# Patient Record
Sex: MALE | Race: WHITE | NOT HISPANIC OR LATINO | Employment: FULL TIME | URBAN - METROPOLITAN AREA
[De-identification: names, ages, dates, MRNs, and addresses within clinical notes are randomized per-mention and may not be internally consistent; named-entity substitution may affect disease eponyms.]

---

## 2023-09-22 ENCOUNTER — HOSPITAL ENCOUNTER (OUTPATIENT)
Dept: DATA CONVERSION | Facility: HOSPITAL | Age: 28
Discharge: HOME | End: 2023-09-23
Payer: COMMERCIAL

## 2023-09-22 DIAGNOSIS — R06.02 SHORTNESS OF BREATH: ICD-10-CM

## 2023-09-22 DIAGNOSIS — Z87.891 PERSONAL HISTORY OF NICOTINE DEPENDENCE: ICD-10-CM

## 2023-09-22 DIAGNOSIS — R07.89 OTHER CHEST PAIN: ICD-10-CM

## 2023-09-22 LAB
ANION GAP SERPL CALCULATED.3IONS-SCNC: 12 MMOL/L (ref 0–19)
BASOPHILS # BLD AUTO: 0.05 K/UL (ref 0–0.22)
BASOPHILS NFR BLD AUTO: 0.5 % (ref 0–1)
BUN SERPL-MCNC: 13 MG/DL (ref 8–25)
BUN/CREAT SERPL: 14.4 RATIO (ref 8–21)
CALCIUM SERPL-MCNC: 9.6 MG/DL (ref 8.5–10.4)
CHLORIDE SERPL-SCNC: 99 MMOL/L (ref 97–107)
CO2 SERPL-SCNC: 21 MMOL/L (ref 24–31)
CREAT SERPL-MCNC: 0.9 MG/DL (ref 0.4–1.6)
D DIMER PPP FEU-MCNC: 0.19 MG/L FEU (ref 0.19–0.5)
DEPRECATED RDW RBC AUTO: 37.6 FL (ref 37–54)
DIFFERENTIAL METHOD BLD: NORMAL
EOSINOPHIL # BLD AUTO: 0.14 K/UL (ref 0–0.45)
EOSINOPHIL NFR BLD: 1.4 % (ref 0–3)
ERYTHROCYTE [DISTWIDTH] IN BLOOD BY AUTOMATED COUNT: 12.1 % (ref 11.7–15)
GFR SERPL CREATININE-BSD FRML MDRD: 120 ML/MIN/1.73 M2
GLUCOSE SERPL-MCNC: 101 MG/DL (ref 65–99)
HCT VFR BLD AUTO: 44.7 % (ref 41–50)
HGB BLD-MCNC: 15.7 GM/DL (ref 13.5–16.5)
HS TROPONIN T DELTA: 0 (ref 0–4)
HS TROPONIN T DELTA: NORMAL (ref 0–4)
IMM GRANULOCYTES # BLD AUTO: 0.03 K/UL (ref 0–0.1)
LYMPHOCYTES # BLD AUTO: 2.32 K/UL (ref 1.2–3.2)
LYMPHOCYTES NFR BLD MANUAL: 22.8 % (ref 20–40)
MCH RBC QN AUTO: 29.9 PG (ref 26–34)
MCHC RBC AUTO-ENTMCNC: 35.1 % (ref 31–37)
MCV RBC AUTO: 85.1 FL (ref 80–100)
MONOCYTES # BLD AUTO: 0.69 K/UL (ref 0–0.8)
MONOCYTES NFR BLD MANUAL: 6.8 % (ref 0–8)
NEUTROPHILS # BLD AUTO: 6.96 K/UL
NEUTROPHILS # BLD AUTO: 6.96 K/UL (ref 1.8–7.7)
NEUTROPHILS.IMMATURE NFR BLD: 0.3 % (ref 0–1)
NEUTS SEG NFR BLD: 68.2 % (ref 50–70)
NRBC BLD-RTO: 0 /100 WBC
PLATELET # BLD AUTO: 290 K/UL (ref 150–450)
PMV BLD AUTO: 8.6 CU (ref 7–12.6)
POTASSIUM SERPL-SCNC: 3.9 MMOL/L (ref 3.4–5.1)
RBC # BLD AUTO: 5.25 M/UL (ref 4.5–5.5)
SODIUM SERPL-SCNC: 132 MMOL/L (ref 133–145)
TROPONIN T SERPL-MCNC: 6 NG/L
TROPONIN T SERPL-MCNC: 6 NG/L
WBC # BLD AUTO: 10.2 K/UL (ref 4.5–11)

## 2023-09-23 LAB
ANION GAP SERPL CALCULATED.3IONS-SCNC: 11 MMOL/L (ref 0–19)
BASOPHILS # BLD AUTO: 0.05 K/UL (ref 0–0.22)
BASOPHILS NFR BLD AUTO: 0.6 % (ref 0–1)
BUN SERPL-MCNC: 18 MG/DL (ref 8–25)
BUN/CREAT SERPL: 13.8 RATIO (ref 8–21)
CALCIUM SERPL-MCNC: 9.3 MG/DL (ref 8.5–10.4)
CHLORIDE SERPL-SCNC: 102 MMOL/L (ref 97–107)
CO2 SERPL-SCNC: 22 MMOL/L (ref 24–31)
CREAT SERPL-MCNC: 1.3 MG/DL (ref 0.4–1.6)
CRP SERPL-MCNC: 1.5 MG/DL (ref 0–2)
DEPRECATED RDW RBC AUTO: 39 FL (ref 37–54)
DIFFERENTIAL METHOD BLD: NORMAL
EOSINOPHIL # BLD AUTO: 0.13 K/UL (ref 0–0.45)
EOSINOPHIL NFR BLD: 1.7 % (ref 0–3)
ERYTHROCYTE [DISTWIDTH] IN BLOOD BY AUTOMATED COUNT: 12.2 % (ref 11.7–15)
ERYTHROCYTE [SEDIMENTATION RATE] IN BLOOD BY WESTERGREN METHOD: 26 MM/HR (ref 0–15)
GFR SERPL CREATININE-BSD FRML MDRD: 77 ML/MIN/1.73 M2
GLUCOSE SERPL-MCNC: 91 MG/DL (ref 65–99)
HCT VFR BLD AUTO: 42.9 % (ref 41–50)
HGB BLD-MCNC: 14.6 GM/DL (ref 13.5–16.5)
HS TROPONIN T DELTA: 0 (ref 0–4)
HS TROPONIN T DELTA: 0 (ref 0–4)
IMM GRANULOCYTES # BLD AUTO: 0.04 K/UL (ref 0–0.1)
LYMPHOCYTES # BLD AUTO: 2.12 K/UL (ref 1.2–3.2)
LYMPHOCYTES NFR BLD MANUAL: 27.3 % (ref 20–40)
MCH RBC QN AUTO: 29.7 PG (ref 26–34)
MCHC RBC AUTO-ENTMCNC: 34 % (ref 31–37)
MCV RBC AUTO: 87.2 FL (ref 80–100)
MONOCYTES # BLD AUTO: 0.59 K/UL (ref 0–0.8)
MONOCYTES NFR BLD MANUAL: 7.6 % (ref 0–8)
NEUTROPHILS # BLD AUTO: 4.83 K/UL
NEUTROPHILS # BLD AUTO: 4.83 K/UL (ref 1.8–7.7)
NEUTROPHILS.IMMATURE NFR BLD: 0.5 % (ref 0–1)
NEUTS SEG NFR BLD: 62.3 % (ref 50–70)
NRBC BLD-RTO: 0 /100 WBC
PLATELET # BLD AUTO: 255 K/UL (ref 150–450)
PMV BLD AUTO: 8.9 CU (ref 7–12.6)
POTASSIUM SERPL-SCNC: 4 MMOL/L (ref 3.4–5.1)
RBC # BLD AUTO: 4.92 M/UL (ref 4.5–5.5)
SODIUM SERPL-SCNC: 135 MMOL/L (ref 133–145)
TROPONIN T SERPL-MCNC: 6 NG/L
TROPONIN T SERPL-MCNC: 6 NG/L
WBC # BLD AUTO: 7.8 K/UL (ref 4.5–11)

## 2023-09-24 LAB
ALT SERPL-CCNC: 19 U/L (ref 5–40)
AMPHETAMINES UR QL SCN>1000 NG/ML: NEGATIVE
ANION GAP SERPL CALCULATED.3IONS-SCNC: 15 MMOL/L (ref 0–19)
AST SERPL-CCNC: 12 U/L (ref 5–40)
BARBITURATES UR QL SCN>300 NG/ML: NEGATIVE
BASOPHILS # BLD AUTO: 0.01 K/UL (ref 0–0.22)
BASOPHILS NFR BLD AUTO: 0.1 % (ref 0–1)
BENZODIAZ UR QL SCN>300 NG/ML: NEGATIVE
BUN SERPL-MCNC: 17 MG/DL (ref 8–25)
BUN/CREAT SERPL: 18.9 RATIO (ref 8–21)
BZE UR QL SCN>300 NG/ML: NEGATIVE
CALCIUM SERPL-MCNC: 9.6 MG/DL (ref 8.5–10.4)
CANNABINOIDS UR QL SCN>50 NG/ML: NEGATIVE
CHLORIDE SERPL-SCNC: 103 MMOL/L (ref 97–107)
CO2 SERPL-SCNC: 20 MMOL/L (ref 24–31)
CREAT SERPL-MCNC: 0.9 MG/DL (ref 0.4–1.6)
DEPRECATED RDW RBC AUTO: 39.6 FL (ref 37–54)
DIFFERENTIAL METHOD BLD: ABNORMAL
DRUG SCREEN COMMENT UR-IMP: NORMAL
EOSINOPHIL # BLD AUTO: 0 K/UL (ref 0–0.45)
EOSINOPHIL NFR BLD: 0 % (ref 0–3)
ERYTHROCYTE [DISTWIDTH] IN BLOOD BY AUTOMATED COUNT: 12.3 % (ref 11.7–15)
FENTANYL+NORFENTANYL UR QL SCN: NORMAL
GFR SERPL CREATININE-BSD FRML MDRD: 120 ML/MIN/1.73 M2
GLUCOSE SERPL-MCNC: 181 MG/DL (ref 65–99)
HBA1C MFR BLD: 5.4 % (ref 4–6)
HCT VFR BLD AUTO: 44.4 % (ref 41–50)
HGB BLD-MCNC: 15 GM/DL (ref 13.5–16.5)
HS TROPONIN T DELTA: 0 (ref 0–4)
IMM GRANULOCYTES # BLD AUTO: 0.06 K/UL (ref 0–0.1)
LYMPHOCYTES # BLD AUTO: 0.95 K/UL (ref 1.2–3.2)
LYMPHOCYTES NFR BLD MANUAL: 9.2 % (ref 20–40)
MCH RBC QN AUTO: 29.8 PG (ref 26–34)
MCHC RBC AUTO-ENTMCNC: 33.8 % (ref 31–37)
MCV RBC AUTO: 88.1 FL (ref 80–100)
METHADONE UR QL SCN>300 NG/ML: NEGATIVE
MONOCYTES # BLD AUTO: 0.09 K/UL (ref 0–0.8)
MONOCYTES NFR BLD MANUAL: 0.9 % (ref 0–8)
NEUTROPHILS # BLD AUTO: 9.18 K/UL
NEUTROPHILS # BLD AUTO: 9.18 K/UL (ref 1.8–7.7)
NEUTROPHILS.IMMATURE NFR BLD: 0.6 % (ref 0–1)
NEUTS SEG NFR BLD: 89.2 % (ref 50–70)
NRBC BLD-RTO: 0 /100 WBC
OPIATES UR QL SCN>300 NG/ML: NORMAL
OXYCODONE UR QL: NORMAL
PCP UR QL SCN>25 NG/ML: NEGATIVE
PLATELET # BLD AUTO: 283 K/UL (ref 150–450)
PMV BLD AUTO: 9.3 CU (ref 7–12.6)
POTASSIUM SERPL-SCNC: 4.4 MMOL/L (ref 3.4–5.1)
RBC # BLD AUTO: 5.04 M/UL (ref 4.5–5.5)
SODIUM SERPL-SCNC: 138 MMOL/L (ref 133–145)
TROPONIN T SERPL-MCNC: 6 NG/L
WBC # BLD AUTO: 10.3 K/UL (ref 4.5–11)

## 2023-09-25 ENCOUNTER — HOSPITAL ENCOUNTER (INPATIENT)
Dept: DATA CONVERSION | Facility: HOSPITAL | Age: 28
LOS: 2 days | Discharge: HOME | End: 2023-09-27
Attending: INTERNAL MEDICINE | Admitting: INTERNAL MEDICINE
Payer: COMMERCIAL

## 2023-09-25 LAB
ANION GAP SERPL CALCULATED.3IONS-SCNC: 11 MMOL/L (ref 0–19)
BUN SERPL-MCNC: 14 MG/DL (ref 8–25)
BUN/CREAT SERPL: 17.5 RATIO (ref 8–21)
CALCIUM SERPL-MCNC: 9.3 MG/DL (ref 8.5–10.4)
CHLORIDE SERPL-SCNC: 107 MMOL/L (ref 97–107)
CO2 SERPL-SCNC: 23 MMOL/L (ref 24–31)
CREAT SERPL-MCNC: 0.8 MG/DL (ref 0.4–1.6)
DEPRECATED RDW RBC AUTO: 39.2 FL (ref 37–54)
ERYTHROCYTE [DISTWIDTH] IN BLOOD BY AUTOMATED COUNT: 12.3 % (ref 11.7–15)
GFR SERPL CREATININE-BSD FRML MDRD: 124 ML/MIN/1.73 M2
GLUCOSE SERPL-MCNC: 178 MG/DL (ref 65–99)
HCT VFR BLD AUTO: 42.1 % (ref 41–50)
HGB BLD-MCNC: 14.3 GM/DL (ref 13.5–16.5)
LIPASE SERPL-CCNC: 20 U/L (ref 16–63)
MCH RBC QN AUTO: 29.9 PG (ref 26–34)
MCHC RBC AUTO-ENTMCNC: 34 % (ref 31–37)
MCV RBC AUTO: 87.9 FL (ref 80–100)
NOTE (COV): NORMAL
NRBC BLD-RTO: 0 /100 WBC
NT-PROBNP SERPL-MCNC: 36 PG/ML (ref 0–93)
PLATELET # BLD AUTO: 296 K/UL (ref 150–450)
PMV BLD AUTO: 9.3 CU (ref 7–12.6)
POTASSIUM SERPL-SCNC: 4.4 MMOL/L (ref 3.4–5.1)
RBC # BLD AUTO: 4.79 M/UL (ref 4.5–5.5)
SARS-COV-2 RNA RESP QL NAA+PROBE: NEGATIVE
SODIUM SERPL-SCNC: 141 MMOL/L (ref 133–145)
SPECIMEN SOURCE (COV): NORMAL
WBC # BLD AUTO: 12.7 K/UL (ref 4.5–11)

## 2023-09-26 LAB
H PYLORI IGG SER-ACNC: NORMAL
H PYLORI IGM SER IA-ACNC: NORMAL

## 2023-09-27 LAB
ALBUMIN SERPL-MCNC: 3.6 GM/DL (ref 3.5–5)
ALBUMIN/GLOB SERPL: 1.1 RATIO (ref 1.5–3)
ALP BLD-CCNC: 58 U/L (ref 35–125)
ALT SERPL-CCNC: 18 U/L (ref 5–40)
ANION GAP SERPL CALCULATED.3IONS-SCNC: 9 MMOL/L (ref 0–19)
AST SERPL-CCNC: 11 U/L (ref 5–40)
BILIRUB SERPL-MCNC: 0.2 MG/DL (ref 0.1–1.2)
BUN SERPL-MCNC: 15 MG/DL (ref 8–25)
BUN/CREAT SERPL: 16.7 RATIO (ref 8–21)
CALCIUM SERPL-MCNC: 9 MG/DL (ref 8.5–10.4)
CHLORIDE SERPL-SCNC: 106 MMOL/L (ref 97–107)
CO2 SERPL-SCNC: 25 MMOL/L (ref 24–31)
CREAT SERPL-MCNC: 0.9 MG/DL (ref 0.4–1.6)
DEPRECATED RDW RBC AUTO: 41.1 FL (ref 37–54)
ERYTHROCYTE [DISTWIDTH] IN BLOOD BY AUTOMATED COUNT: 12.7 % (ref 11.7–15)
GFR SERPL CREATININE-BSD FRML MDRD: 120 ML/MIN/1.73 M2
GLOBULIN SER-MCNC: 3.2 G/DL (ref 1.9–3.7)
GLUCOSE SERPL-MCNC: 112 MG/DL (ref 65–99)
HCT VFR BLD AUTO: 40.3 % (ref 41–50)
HGB BLD-MCNC: 13.3 GM/DL (ref 13.5–16.5)
MCH RBC QN AUTO: 29.4 PG (ref 26–34)
MCHC RBC AUTO-ENTMCNC: 33 % (ref 31–37)
MCV RBC AUTO: 89 FL (ref 80–100)
NRBC BLD-RTO: 0 /100 WBC
PLATELET # BLD AUTO: 273 K/UL (ref 150–450)
PMV BLD AUTO: 9.2 CU (ref 7–12.6)
POTASSIUM SERPL-SCNC: 4.6 MMOL/L (ref 3.4–5.1)
PROT SERPL-MCNC: 6.8 G/DL (ref 5.9–7.9)
RBC # BLD AUTO: 4.53 M/UL (ref 4.5–5.5)
SODIUM SERPL-SCNC: 140 MMOL/L (ref 133–145)
T4 FREE SERPL-MCNC: 1.1 NG/DL (ref 0.9–1.7)
TSH SERPL DL<=0.05 MIU/L-ACNC: 0.47 MIU/L (ref 0.27–4.2)
WBC # BLD AUTO: 13.8 K/UL (ref 4.5–11)

## 2023-09-29 ENCOUNTER — PATIENT OUTREACH (OUTPATIENT)
Dept: CARE COORDINATION | Facility: CLINIC | Age: 28
End: 2023-09-29
Payer: COMMERCIAL

## 2023-09-29 NOTE — PROGRESS NOTES
EHP member LELIA outreach. No answer, unable to leave message.    Discharge from [facility].   Discharge Dx: Asthmatic bronchitis, Tight chest, Dyspnea, Wheezing, Ulcerative colitis. Status Improving     Attempted outreach to see if member has everything they need.   Any new or worsening symptoms.  Assistance scheduling follow up appointments.

## 2023-10-06 PROBLEM — H44.709 RETAINED FOREIGN BODY IN EYE: Status: ACTIVE | Noted: 2023-10-06

## 2023-10-06 PROBLEM — F17.200 SMOKING: Status: ACTIVE | Noted: 2023-10-06

## 2023-10-06 PROBLEM — R14.0 ABDOMINAL BLOATING: Status: ACTIVE | Noted: 2023-10-06

## 2023-10-06 PROBLEM — F17.201 TOBACCO DEPENDENCE IN REMISSION: Status: ACTIVE | Noted: 2023-10-06

## 2023-10-06 PROBLEM — G89.29 OTHER CHRONIC PAIN: Status: ACTIVE | Noted: 2023-10-06

## 2023-10-06 PROBLEM — R11.2 NAUSEA & VOMITING: Status: ACTIVE | Noted: 2023-10-06

## 2023-10-06 PROBLEM — R10.84 GENERALIZED ABDOMINAL PAIN: Status: ACTIVE | Noted: 2023-10-06

## 2023-10-06 PROBLEM — Q89.9 UMBILICAL ABNORMALITY: Status: ACTIVE | Noted: 2020-06-01

## 2023-10-06 PROBLEM — T14.8XXA CRUSHING INJURY: Status: ACTIVE | Noted: 2023-10-06

## 2023-10-06 PROBLEM — S49.90XA SHOULDER INJURY: Status: ACTIVE | Noted: 2023-10-06

## 2023-10-06 PROBLEM — K21.9 CHRONIC GERD: Status: ACTIVE | Noted: 2023-10-06

## 2023-10-06 PROBLEM — K51.90 ULCERATIVE COLITIS WITHOUT COMPLICATIONS (MULTI): Status: ACTIVE | Noted: 2023-10-06

## 2023-10-06 PROBLEM — S61.219A LACERATION OF FINGER: Status: ACTIVE | Noted: 2023-10-06

## 2023-10-06 RX ORDER — MULTIVITAMIN
1 TABLET ORAL DAILY
COMMUNITY

## 2023-10-06 RX ORDER — ONDANSETRON 8 MG/1
8 TABLET, ORALLY DISINTEGRATING ORAL EVERY 8 HOURS PRN
COMMUNITY
Start: 2022-04-02 | End: 2023-12-29 | Stop reason: HOSPADM

## 2023-10-06 RX ORDER — HYDROXYZINE PAMOATE 25 MG/1
25 CAPSULE ORAL 3 TIMES DAILY PRN
Status: ON HOLD | COMMUNITY
Start: 2023-09-27 | End: 2023-12-28 | Stop reason: WASHOUT

## 2023-10-06 RX ORDER — ACETAMINOPHEN 500 MG
4000 TABLET ORAL DAILY
COMMUNITY
Start: 2021-06-28

## 2023-10-06 RX ORDER — NITROGLYCERIN 0.4 MG/1
0.4 TABLET SUBLINGUAL EVERY 5 MIN PRN
COMMUNITY
Start: 2023-09-23 | End: 2023-12-29 | Stop reason: HOSPADM

## 2023-10-06 RX ORDER — BUPROPION HYDROCHLORIDE 150 MG/1
150 TABLET, FILM COATED, EXTENDED RELEASE ORAL 2 TIMES DAILY
Status: ON HOLD | COMMUNITY
End: 2023-12-28 | Stop reason: WASHOUT

## 2023-10-06 RX ORDER — DICYCLOMINE HYDROCHLORIDE 20 MG/1
20 TABLET ORAL 4 TIMES DAILY PRN
COMMUNITY
Start: 2021-02-23

## 2023-10-06 RX ORDER — PANTOPRAZOLE SODIUM 40 MG/1
40 TABLET, DELAYED RELEASE ORAL DAILY
COMMUNITY

## 2023-10-06 RX ORDER — FAMOTIDINE 40 MG/1
40 TABLET, FILM COATED ORAL DAILY
COMMUNITY

## 2023-10-06 RX ORDER — OXYCODONE AND ACETAMINOPHEN 5; 325 MG/1; MG/1
1 TABLET ORAL EVERY 6 HOURS PRN
Status: ON HOLD | COMMUNITY
End: 2023-12-28 | Stop reason: WASHOUT

## 2023-10-06 RX ORDER — TRAZODONE HYDROCHLORIDE 50 MG/1
1-2 TABLET ORAL NIGHTLY
COMMUNITY

## 2023-10-06 RX ORDER — METOPROLOL SUCCINATE 25 MG/1
25 TABLET, EXTENDED RELEASE ORAL DAILY
Status: ON HOLD | COMMUNITY
Start: 2023-09-27 | End: 2023-12-28 | Stop reason: ALTCHOICE

## 2023-10-06 RX ORDER — SUCRALFATE 1 G/1
1 TABLET ORAL
COMMUNITY

## 2023-10-06 RX ORDER — METFORMIN HYDROCHLORIDE 500 MG/1
500 TABLET ORAL 2 TIMES DAILY
Status: ON HOLD | COMMUNITY
Start: 2023-08-08 | End: 2023-12-28 | Stop reason: ALTCHOICE

## 2023-10-06 RX ORDER — FLUTICASONE PROPIONATE 50 MCG
2 SPRAY, SUSPENSION (ML) NASAL DAILY
COMMUNITY
Start: 2021-12-14

## 2023-10-06 RX ORDER — PROCHLORPERAZINE MALEATE 10 MG
10 TABLET ORAL EVERY 6 HOURS PRN
Status: ON HOLD | COMMUNITY
End: 2023-12-28 | Stop reason: WASHOUT

## 2023-10-06 RX ORDER — ESOMEPRAZOLE MAGNESIUM 40 MG/1
40 CAPSULE, DELAYED RELEASE ORAL
COMMUNITY
Start: 2020-11-03 | End: 2023-12-29 | Stop reason: HOSPADM

## 2023-10-06 RX ORDER — ASCORBIC ACID 500 MG
1500 TABLET ORAL DAILY
COMMUNITY

## 2023-10-06 RX ORDER — CYCLOBENZAPRINE HCL 10 MG
10 TABLET ORAL 2 TIMES DAILY PRN
COMMUNITY
Start: 2019-12-05

## 2023-10-06 RX ORDER — MESALAMINE 1.2 G/1
2400 TABLET, DELAYED RELEASE ORAL DAILY
COMMUNITY
Start: 2018-03-14

## 2023-10-12 ENCOUNTER — PATIENT OUTREACH (OUTPATIENT)
Dept: CARE COORDINATION | Facility: CLINIC | Age: 28
End: 2023-10-12
Payer: COMMERCIAL

## 2023-10-12 NOTE — PROGRESS NOTES
EHP member LELIA outreach. Left message requesting return call. Does member have any new or worsening symptoms. Does member have everything they need.  Is there any assistance I could provide.   Nelly Proctor, CMA, SPN  [facility]/Population Health

## 2023-10-16 ENCOUNTER — PATIENT OUTREACH (OUTPATIENT)
Dept: CARE COORDINATION | Facility: CLINIC | Age: 28
End: 2023-10-16
Payer: COMMERCIAL

## 2023-10-16 NOTE — PROGRESS NOTES
EHP member LELIA Hitchcock outreach. Left message requesting return call.     Hospital discharge 9/25/23.    Nelly Proctor, CMA, SPN  [facility]      ambulatory

## 2023-10-30 PROBLEM — I89.0 LYMPHEDEMA: Status: ACTIVE | Noted: 2023-10-30

## 2023-10-30 PROBLEM — G47.9 TROUBLE IN SLEEPING: Status: ACTIVE | Noted: 2023-10-30

## 2023-10-30 PROBLEM — F41.9 ANXIETY: Status: ACTIVE | Noted: 2023-10-30

## 2023-11-08 ENCOUNTER — DOCUMENTATION (OUTPATIENT)
Dept: GASTROENTEROLOGY | Facility: CLINIC | Age: 28
End: 2023-11-08
Payer: COMMERCIAL

## 2023-11-08 NOTE — Clinical Note
Pls let patient know the bx from his egd while he was inpatient were normal. He can f.up w me if he continues to have GI symptoms

## 2023-11-09 NOTE — PROGRESS NOTES
Biopsies done during his EGD while inpatient were normal. F.up PMD or with GI if continued symptoms

## 2023-11-11 ENCOUNTER — LAB REQUISITION (OUTPATIENT)
Dept: LAB | Facility: HOSPITAL | Age: 28
End: 2023-11-11
Payer: COMMERCIAL

## 2023-11-11 LAB — SARS-COV-2 RNA RESP QL NAA+PROBE: DETECTED

## 2023-11-11 PROCEDURE — 87635 SARS-COV-2 COVID-19 AMP PRB: CPT

## 2023-11-13 ENCOUNTER — PHARMACY VISIT (OUTPATIENT)
Dept: PHARMACY | Facility: CLINIC | Age: 28
End: 2023-11-13
Payer: COMMERCIAL

## 2023-11-13 PROCEDURE — RXMED WILLOW AMBULATORY MEDICATION CHARGE

## 2023-11-13 RX ORDER — AZITHROMYCIN 500 MG/1
TABLET, FILM COATED ORAL
Qty: 5 TABLET | Refills: 0 | OUTPATIENT
Start: 2023-11-13 | End: 2023-12-29 | Stop reason: HOSPADM

## 2023-11-13 RX ORDER — NIRMATRELVIR AND RITONAVIR 300-100 MG
KIT ORAL
Qty: 30 TABLET | Refills: 0 | OUTPATIENT
Start: 2023-11-13 | End: 2023-12-29 | Stop reason: HOSPADM

## 2023-11-13 RX ORDER — DEXAMETHASONE 6 MG/1
TABLET ORAL
Qty: 10 TABLET | Refills: 0 | OUTPATIENT
Start: 2023-11-13 | End: 2023-12-29 | Stop reason: HOSPADM

## 2023-12-27 ENCOUNTER — HOSPITAL ENCOUNTER (OUTPATIENT)
Facility: HOSPITAL | Age: 28
Setting detail: OBSERVATION
Discharge: HOME | End: 2023-12-29
Attending: INTERNAL MEDICINE | Admitting: INTERNAL MEDICINE
Payer: COMMERCIAL

## 2023-12-27 DIAGNOSIS — M54.42 LEFT-SIDED LOW BACK PAIN WITH LEFT-SIDED SCIATICA, UNSPECIFIED CHRONICITY: Primary | ICD-10-CM

## 2023-12-27 DIAGNOSIS — M54.9 BACK PAIN ASSOCIATED WITH PERIPHERAL NUMBNESS: ICD-10-CM

## 2023-12-27 DIAGNOSIS — R20.0 BACK PAIN ASSOCIATED WITH PERIPHERAL NUMBNESS: ICD-10-CM

## 2023-12-27 PROCEDURE — 99285 EMERGENCY DEPT VISIT HI MDM: CPT

## 2023-12-27 PROCEDURE — G0378 HOSPITAL OBSERVATION PER HR: HCPCS

## 2023-12-27 PROCEDURE — 96372 THER/PROPH/DIAG INJ SC/IM: CPT

## 2023-12-27 PROCEDURE — 2500000001 HC RX 250 WO HCPCS SELF ADMINISTERED DRUGS (ALT 637 FOR MEDICARE OP)

## 2023-12-27 PROCEDURE — 2500000004 HC RX 250 GENERAL PHARMACY W/ HCPCS (ALT 636 FOR OP/ED)

## 2023-12-27 RX ORDER — HYDROCODONE BITARTRATE AND ACETAMINOPHEN 5; 325 MG/1; MG/1
1 TABLET ORAL ONCE
Status: COMPLETED | OUTPATIENT
Start: 2023-12-27 | End: 2023-12-27

## 2023-12-27 RX ORDER — KETOROLAC TROMETHAMINE 30 MG/ML
30 INJECTION, SOLUTION INTRAMUSCULAR; INTRAVENOUS ONCE
Status: COMPLETED | OUTPATIENT
Start: 2023-12-27 | End: 2023-12-27

## 2023-12-27 RX ADMIN — HYDROCODONE BITARTRATE AND ACETAMINOPHEN 1 TABLET: 5; 325 TABLET ORAL at 21:04

## 2023-12-27 RX ADMIN — KETOROLAC TROMETHAMINE 30 MG: 30 INJECTION, SOLUTION INTRAMUSCULAR; INTRAVENOUS at 21:05

## 2023-12-27 ASSESSMENT — PAIN DESCRIPTION - PAIN TYPE: TYPE: ACUTE PAIN

## 2023-12-27 ASSESSMENT — COLUMBIA-SUICIDE SEVERITY RATING SCALE - C-SSRS
2. HAVE YOU ACTUALLY HAD ANY THOUGHTS OF KILLING YOURSELF?: NO
6. HAVE YOU EVER DONE ANYTHING, STARTED TO DO ANYTHING, OR PREPARED TO DO ANYTHING TO END YOUR LIFE?: NO
1. IN THE PAST MONTH, HAVE YOU WISHED YOU WERE DEAD OR WISHED YOU COULD GO TO SLEEP AND NOT WAKE UP?: NO

## 2023-12-27 ASSESSMENT — LIFESTYLE VARIABLES
EVER HAD A DRINK FIRST THING IN THE MORNING TO STEADY YOUR NERVES TO GET RID OF A HANGOVER: NO
REASON UNABLE TO ASSESS: NO
HAVE PEOPLE ANNOYED YOU BY CRITICIZING YOUR DRINKING: NO
EVER FELT BAD OR GUILTY ABOUT YOUR DRINKING: NO
HAVE YOU EVER FELT YOU SHOULD CUT DOWN ON YOUR DRINKING: NO

## 2023-12-27 ASSESSMENT — PAIN SCALES - GENERAL
PAINLEVEL_OUTOF10: 6

## 2023-12-27 ASSESSMENT — PAIN - FUNCTIONAL ASSESSMENT
PAIN_FUNCTIONAL_ASSESSMENT: 0-10
PAIN_FUNCTIONAL_ASSESSMENT: 0-10

## 2023-12-27 ASSESSMENT — PAIN DESCRIPTION - ORIENTATION
ORIENTATION: LEFT
ORIENTATION: LEFT

## 2023-12-27 ASSESSMENT — PAIN DESCRIPTION - LOCATION
LOCATION: LEG
LOCATION: LEG

## 2023-12-27 ASSESSMENT — PAIN DESCRIPTION - FREQUENCY: FREQUENCY: CONSTANT/CONTINUOUS

## 2023-12-27 NOTE — LETTER
December 29, 2023     Patient: Yvon Parks   YOB: 1995   Date of Visit: 12/27/2023       To Whom It May Concern:    Yvon Parks was admitted under the care of Dr. Guille Shi from 12/27/2023 thru 12/29/2023.  Please excuse Yvon for his absence from work 12/30/2023 and 12/31/2023.  He may return to work 1/6/2024.      If you have any questions or concerns, please contact Dr. Shi's office at [phone].         Sincerely,       Hiral Boyer CNP      CC: No Recipients

## 2023-12-27 NOTE — ED TRIAGE NOTES
Presenting to ED with lower back and left leg pain/numbness. PT has had recent falls from left leg going numb. States pain has been constant in hip, pinching type pain. Numbness in left upper thigh. PT has feeling below knee. MSPS are intact, pt can bear weight but is limping and in pain.

## 2023-12-28 ENCOUNTER — APPOINTMENT (OUTPATIENT)
Dept: RADIOLOGY | Facility: HOSPITAL | Age: 28
End: 2023-12-28
Payer: COMMERCIAL

## 2023-12-28 ENCOUNTER — APPOINTMENT (OUTPATIENT)
Dept: CARDIOLOGY | Facility: HOSPITAL | Age: 28
End: 2023-12-28
Payer: COMMERCIAL

## 2023-12-28 LAB
ALBUMIN SERPL-MCNC: 4 G/DL (ref 3.5–5)
ALP BLD-CCNC: 78 U/L (ref 35–125)
ALT SERPL-CCNC: 22 U/L (ref 5–40)
ANION GAP SERPL CALC-SCNC: 13 MMOL/L
APPEARANCE UR: CLEAR
AST SERPL-CCNC: 19 U/L (ref 5–40)
BILIRUB SERPL-MCNC: 0.2 MG/DL (ref 0.1–1.2)
BILIRUB UR STRIP.AUTO-MCNC: NEGATIVE MG/DL
BUN SERPL-MCNC: 18 MG/DL (ref 8–25)
CALCIUM SERPL-MCNC: 9.3 MG/DL (ref 8.5–10.4)
CHLORIDE SERPL-SCNC: 105 MMOL/L (ref 97–107)
CO2 SERPL-SCNC: 22 MMOL/L (ref 24–31)
COLOR UR: YELLOW
CREAT SERPL-MCNC: 0.9 MG/DL (ref 0.4–1.6)
ERYTHROCYTE [DISTWIDTH] IN BLOOD BY AUTOMATED COUNT: 12.9 % (ref 11.5–14.5)
GFR SERPL CREATININE-BSD FRML MDRD: >90 ML/MIN/1.73M*2
GLUCOSE SERPL-MCNC: 139 MG/DL (ref 65–99)
GLUCOSE UR STRIP.AUTO-MCNC: ABNORMAL MG/DL
HCT VFR BLD AUTO: 42.5 % (ref 41–52)
HGB BLD-MCNC: 14.5 G/DL (ref 13.5–17.5)
KETONES UR STRIP.AUTO-MCNC: NEGATIVE MG/DL
LEUKOCYTE ESTERASE UR QL STRIP.AUTO: NEGATIVE
MCH RBC QN AUTO: 30 PG (ref 26–34)
MCHC RBC AUTO-ENTMCNC: 34.1 G/DL (ref 32–36)
MCV RBC AUTO: 88 FL (ref 80–100)
NITRITE UR QL STRIP.AUTO: NEGATIVE
NRBC BLD-RTO: 0 /100 WBCS (ref 0–0)
PH UR STRIP.AUTO: 5.5 [PH]
PLATELET # BLD AUTO: 254 X10*3/UL (ref 150–450)
POTASSIUM SERPL-SCNC: 4.2 MMOL/L (ref 3.4–5.1)
PROT SERPL-MCNC: 7.2 G/DL (ref 5.9–7.9)
PROT UR STRIP.AUTO-MCNC: NEGATIVE MG/DL
RBC # BLD AUTO: 4.84 X10*6/UL (ref 4.5–5.9)
RBC # UR STRIP.AUTO: NEGATIVE /UL
SODIUM SERPL-SCNC: 140 MMOL/L (ref 133–145)
SP GR UR STRIP.AUTO: 1.03
UROBILINOGEN UR STRIP.AUTO-MCNC: NORMAL MG/DL
WBC # BLD AUTO: 7.9 X10*3/UL (ref 4.4–11.3)

## 2023-12-28 PROCEDURE — 36415 COLL VENOUS BLD VENIPUNCTURE: CPT | Performed by: INTERNAL MEDICINE

## 2023-12-28 PROCEDURE — 97165 OT EVAL LOW COMPLEX 30 MIN: CPT | Mod: GO

## 2023-12-28 PROCEDURE — G0378 HOSPITAL OBSERVATION PER HR: HCPCS

## 2023-12-28 PROCEDURE — 81003 URINALYSIS AUTO W/O SCOPE: CPT | Performed by: INTERNAL MEDICINE

## 2023-12-28 PROCEDURE — 72148 MRI LUMBAR SPINE W/O DYE: CPT

## 2023-12-28 PROCEDURE — 96375 TX/PRO/DX INJ NEW DRUG ADDON: CPT

## 2023-12-28 PROCEDURE — 83036 HEMOGLOBIN GLYCOSYLATED A1C: CPT | Performed by: NURSE PRACTITIONER

## 2023-12-28 PROCEDURE — 96374 THER/PROPH/DIAG INJ IV PUSH: CPT | Mod: 59

## 2023-12-28 PROCEDURE — 2500000002 HC RX 250 W HCPCS SELF ADMINISTERED DRUGS (ALT 637 FOR MEDICARE OP, ALT 636 FOR OP/ED): Performed by: INTERNAL MEDICINE

## 2023-12-28 PROCEDURE — 85027 COMPLETE CBC AUTOMATED: CPT | Performed by: INTERNAL MEDICINE

## 2023-12-28 PROCEDURE — 97161 PT EVAL LOW COMPLEX 20 MIN: CPT | Mod: GP

## 2023-12-28 PROCEDURE — 93005 ELECTROCARDIOGRAM TRACING: CPT

## 2023-12-28 PROCEDURE — 96372 THER/PROPH/DIAG INJ SC/IM: CPT | Performed by: INTERNAL MEDICINE

## 2023-12-28 PROCEDURE — 2500000001 HC RX 250 WO HCPCS SELF ADMINISTERED DRUGS (ALT 637 FOR MEDICARE OP): Performed by: INTERNAL MEDICINE

## 2023-12-28 PROCEDURE — 2500000004 HC RX 250 GENERAL PHARMACY W/ HCPCS (ALT 636 FOR OP/ED): Performed by: INTERNAL MEDICINE

## 2023-12-28 PROCEDURE — 80053 COMPREHEN METABOLIC PANEL: CPT | Performed by: INTERNAL MEDICINE

## 2023-12-28 RX ORDER — ACETAMINOPHEN 160 MG/5ML
650 SOLUTION ORAL EVERY 4 HOURS PRN
Status: DISCONTINUED | OUTPATIENT
Start: 2023-12-28 | End: 2023-12-29 | Stop reason: HOSPADM

## 2023-12-28 RX ORDER — ACETAMINOPHEN 325 MG/1
650 TABLET ORAL EVERY 4 HOURS PRN
Status: DISCONTINUED | OUTPATIENT
Start: 2023-12-28 | End: 2023-12-29 | Stop reason: HOSPADM

## 2023-12-28 RX ORDER — BISMUTH SUBSALICYLATE 262 MG
1 TABLET,CHEWABLE ORAL DAILY
Status: DISCONTINUED | OUTPATIENT
Start: 2023-12-28 | End: 2023-12-29 | Stop reason: HOSPADM

## 2023-12-28 RX ORDER — FAMOTIDINE 20 MG/1
40 TABLET, FILM COATED ORAL DAILY
Status: DISCONTINUED | OUTPATIENT
Start: 2023-12-28 | End: 2023-12-29 | Stop reason: HOSPADM

## 2023-12-28 RX ORDER — ASCORBIC ACID 500 MG
1500 TABLET ORAL DAILY
Status: DISCONTINUED | OUTPATIENT
Start: 2023-12-28 | End: 2023-12-29 | Stop reason: HOSPADM

## 2023-12-28 RX ORDER — METHYLPREDNISOLONE 4 MG/1
4 TABLET ORAL ONCE
Status: DISCONTINUED | OUTPATIENT
Start: 2024-01-02 | End: 2023-12-29 | Stop reason: HOSPADM

## 2023-12-28 RX ORDER — SUCRALFATE 1 G/1
1 TABLET ORAL
Status: DISCONTINUED | OUTPATIENT
Start: 2023-12-28 | End: 2023-12-29 | Stop reason: HOSPADM

## 2023-12-28 RX ORDER — TRAZODONE HYDROCHLORIDE 50 MG/1
50 TABLET ORAL NIGHTLY
Status: DISCONTINUED | OUTPATIENT
Start: 2023-12-28 | End: 2023-12-29 | Stop reason: HOSPADM

## 2023-12-28 RX ORDER — GUAIFENESIN 600 MG/1
600 TABLET, EXTENDED RELEASE ORAL EVERY 12 HOURS PRN
Status: DISCONTINUED | OUTPATIENT
Start: 2023-12-28 | End: 2023-12-29 | Stop reason: HOSPADM

## 2023-12-28 RX ORDER — METOPROLOL SUCCINATE 25 MG/1
25 TABLET, EXTENDED RELEASE ORAL DAILY
Status: DISCONTINUED | OUTPATIENT
Start: 2023-12-28 | End: 2023-12-29 | Stop reason: HOSPADM

## 2023-12-28 RX ORDER — METFORMIN HYDROCHLORIDE 500 MG/1
500 TABLET ORAL 2 TIMES DAILY
Status: DISCONTINUED | OUTPATIENT
Start: 2023-12-28 | End: 2023-12-29 | Stop reason: HOSPADM

## 2023-12-28 RX ORDER — PROCHLORPERAZINE MALEATE 10 MG
10 TABLET ORAL EVERY 6 HOURS PRN
Status: DISCONTINUED | OUTPATIENT
Start: 2023-12-28 | End: 2023-12-29 | Stop reason: HOSPADM

## 2023-12-28 RX ORDER — POLYETHYLENE GLYCOL 3350 17 G/17G
17 POWDER, FOR SOLUTION ORAL DAILY PRN
Status: DISCONTINUED | OUTPATIENT
Start: 2023-12-28 | End: 2023-12-29 | Stop reason: HOSPADM

## 2023-12-28 RX ORDER — ONDANSETRON 4 MG/1
4 TABLET, FILM COATED ORAL EVERY 4 HOURS PRN
Status: DISCONTINUED | OUTPATIENT
Start: 2023-12-28 | End: 2023-12-29 | Stop reason: HOSPADM

## 2023-12-28 RX ORDER — CYCLOBENZAPRINE HCL 10 MG
10 TABLET ORAL 2 TIMES DAILY PRN
Status: DISCONTINUED | OUTPATIENT
Start: 2023-12-28 | End: 2023-12-29 | Stop reason: HOSPADM

## 2023-12-28 RX ORDER — CHOLECALCIFEROL (VITAMIN D3) 50 MCG
4000 TABLET ORAL DAILY
Status: DISCONTINUED | OUTPATIENT
Start: 2023-12-28 | End: 2023-12-29 | Stop reason: HOSPADM

## 2023-12-28 RX ORDER — METHYLPREDNISOLONE 16 MG/1
16 TABLET ORAL ONCE
Status: DISCONTINUED | OUTPATIENT
Start: 2023-12-30 | End: 2023-12-29 | Stop reason: HOSPADM

## 2023-12-28 RX ORDER — METHYLPREDNISOLONE 4 MG/1
12 TABLET ORAL ONCE
Status: DISCONTINUED | OUTPATIENT
Start: 2023-12-31 | End: 2023-12-29 | Stop reason: HOSPADM

## 2023-12-28 RX ORDER — METHYLPREDNISOLONE 4 MG/1
8 TABLET ORAL ONCE
Status: DISCONTINUED | OUTPATIENT
Start: 2024-01-01 | End: 2023-12-29 | Stop reason: HOSPADM

## 2023-12-28 RX ORDER — NITROGLYCERIN 0.4 MG/1
0.4 TABLET SUBLINGUAL EVERY 5 MIN PRN
Status: DISCONTINUED | OUTPATIENT
Start: 2023-12-28 | End: 2023-12-29 | Stop reason: HOSPADM

## 2023-12-28 RX ORDER — PANTOPRAZOLE SODIUM 40 MG/1
40 TABLET, DELAYED RELEASE ORAL
Status: DISCONTINUED | OUTPATIENT
Start: 2023-12-28 | End: 2023-12-29 | Stop reason: HOSPADM

## 2023-12-28 RX ORDER — BUPROPION HYDROCHLORIDE 75 MG/1
150 TABLET ORAL 2 TIMES DAILY
Status: DISCONTINUED | OUTPATIENT
Start: 2023-12-28 | End: 2023-12-28

## 2023-12-28 RX ORDER — METHYLPREDNISOLONE 4 MG/1
20 TABLET ORAL ONCE
Status: DISCONTINUED | OUTPATIENT
Start: 2023-12-29 | End: 2023-12-29 | Stop reason: HOSPADM

## 2023-12-28 RX ORDER — DICLOFENAC SODIUM 10 MG/G
4 GEL TOPICAL 4 TIMES DAILY PRN
Status: DISCONTINUED | OUTPATIENT
Start: 2023-12-28 | End: 2023-12-29 | Stop reason: HOSPADM

## 2023-12-28 RX ORDER — ACETAMINOPHEN 650 MG/1
650 SUPPOSITORY RECTAL EVERY 4 HOURS PRN
Status: DISCONTINUED | OUTPATIENT
Start: 2023-12-28 | End: 2023-12-29 | Stop reason: HOSPADM

## 2023-12-28 RX ORDER — MORPHINE SULFATE 2 MG/ML
2 INJECTION, SOLUTION INTRAMUSCULAR; INTRAVENOUS EVERY 4 HOURS PRN
Status: DISCONTINUED | OUTPATIENT
Start: 2023-12-28 | End: 2023-12-29 | Stop reason: HOSPADM

## 2023-12-28 RX ORDER — KETOROLAC TROMETHAMINE 30 MG/ML
30 INJECTION, SOLUTION INTRAMUSCULAR; INTRAVENOUS ONCE
Status: COMPLETED | OUTPATIENT
Start: 2023-12-28 | End: 2023-12-28

## 2023-12-28 RX ORDER — HEPARIN SODIUM 5000 [USP'U]/ML
5000 INJECTION, SOLUTION INTRAVENOUS; SUBCUTANEOUS EVERY 8 HOURS SCHEDULED
Status: DISCONTINUED | OUTPATIENT
Start: 2023-12-28 | End: 2023-12-29 | Stop reason: HOSPADM

## 2023-12-28 RX ORDER — GUAIFENESIN/DEXTROMETHORPHAN 100-10MG/5
5 SYRUP ORAL EVERY 4 HOURS PRN
Status: DISCONTINUED | OUTPATIENT
Start: 2023-12-28 | End: 2023-12-29 | Stop reason: HOSPADM

## 2023-12-28 RX ORDER — FLUTICASONE PROPIONATE 50 MCG
2 SPRAY, SUSPENSION (ML) NASAL DAILY
Status: DISCONTINUED | OUTPATIENT
Start: 2023-12-28 | End: 2023-12-29 | Stop reason: HOSPADM

## 2023-12-28 RX ORDER — METHYLPREDNISOLONE 4 MG/1
24 TABLET ORAL ONCE
Status: COMPLETED | OUTPATIENT
Start: 2023-12-28 | End: 2023-12-28

## 2023-12-28 RX ORDER — MESALAMINE 1.2 G/1
2400 TABLET, DELAYED RELEASE ORAL DAILY
Status: DISCONTINUED | OUTPATIENT
Start: 2023-12-28 | End: 2023-12-29 | Stop reason: HOSPADM

## 2023-12-28 RX ADMIN — FAMOTIDINE 40 MG: 20 TABLET ORAL at 09:01

## 2023-12-28 RX ADMIN — HEPARIN SODIUM 5000 UNITS: 5000 INJECTION, SOLUTION INTRAVENOUS; SUBCUTANEOUS at 21:02

## 2023-12-28 RX ADMIN — CYCLOBENZAPRINE 10 MG: 10 TABLET, FILM COATED ORAL at 17:21

## 2023-12-28 RX ADMIN — MESALAMINE 2.4 G: 800 TABLET, DELAYED RELEASE ORAL at 09:01

## 2023-12-28 RX ADMIN — METHYLPREDNISOLONE 24 MG: 4 TABLET ORAL at 21:01

## 2023-12-28 RX ADMIN — KETOROLAC TROMETHAMINE 30 MG: 30 INJECTION, SOLUTION INTRAMUSCULAR; INTRAVENOUS at 01:06

## 2023-12-28 RX ADMIN — METOPROLOL SUCCINATE 25 MG: 100 TABLET, EXTENDED RELEASE ORAL at 09:01

## 2023-12-28 RX ADMIN — TRAZODONE HYDROCHLORIDE 50 MG: 50 TABLET ORAL at 01:06

## 2023-12-28 RX ADMIN — ACETAMINOPHEN 650 MG: 325 TABLET ORAL at 17:23

## 2023-12-28 RX ADMIN — METFORMIN HYDROCHLORIDE 500 MG: 500 TABLET, FILM COATED ORAL at 21:02

## 2023-12-28 RX ADMIN — CYCLOBENZAPRINE 10 MG: 10 TABLET, FILM COATED ORAL at 01:06

## 2023-12-28 RX ADMIN — METFORMIN HYDROCHLORIDE 500 MG: 500 TABLET, FILM COATED ORAL at 09:01

## 2023-12-28 RX ADMIN — Medication 4000 UNITS: at 05:19

## 2023-12-28 RX ADMIN — MORPHINE SULFATE 2 MG: 2 INJECTION, SOLUTION INTRAMUSCULAR; INTRAVENOUS at 21:02

## 2023-12-28 RX ADMIN — HEPARIN SODIUM 5000 UNITS: 5000 INJECTION, SOLUTION INTRAVENOUS; SUBCUTANEOUS at 05:19

## 2023-12-28 RX ADMIN — PANTOPRAZOLE SODIUM 40 MG: 40 TABLET, DELAYED RELEASE ORAL at 06:21

## 2023-12-28 RX ADMIN — METHYLPREDNISOLONE SODIUM SUCCINATE 125 MG: 125 INJECTION, POWDER, FOR SOLUTION INTRAMUSCULAR; INTRAVENOUS at 01:06

## 2023-12-28 RX ADMIN — MULTIVITAMIN TABLET 1 TABLET: TABLET at 09:01

## 2023-12-28 RX ADMIN — OXYCODONE HYDROCHLORIDE AND ACETAMINOPHEN 1500 MG: 500 TABLET ORAL at 09:07

## 2023-12-28 RX ADMIN — FLUTICASONE PROPIONATE 2 SPRAY: 50 SPRAY, METERED NASAL at 09:00

## 2023-12-28 RX ADMIN — HEPARIN SODIUM 5000 UNITS: 5000 INJECTION, SOLUTION INTRAVENOUS; SUBCUTANEOUS at 01:06

## 2023-12-28 RX ADMIN — SUCRALFATE 1 G: 1 TABLET ORAL at 06:21

## 2023-12-28 RX ADMIN — SUCRALFATE 1 G: 1 TABLET ORAL at 17:20

## 2023-12-28 RX ADMIN — HEPARIN SODIUM 5000 UNITS: 5000 INJECTION, SOLUTION INTRAVENOUS; SUBCUTANEOUS at 14:13

## 2023-12-28 SDOH — SOCIAL STABILITY: SOCIAL INSECURITY: DO YOU FEEL ANYONE HAS EXPLOITED OR TAKEN ADVANTAGE OF YOU FINANCIALLY OR OF YOUR PERSONAL PROPERTY?: NO

## 2023-12-28 SDOH — SOCIAL STABILITY: SOCIAL INSECURITY: HAVE YOU HAD THOUGHTS OF HARMING ANYONE ELSE?: NO

## 2023-12-28 SDOH — ECONOMIC STABILITY: HOUSING INSECURITY: IN THE LAST 12 MONTHS, HOW MANY PLACES HAVE YOU LIVED?: 1

## 2023-12-28 SDOH — ECONOMIC STABILITY: INCOME INSECURITY: IN THE LAST 12 MONTHS, WAS THERE A TIME WHEN YOU WERE NOT ABLE TO PAY THE MORTGAGE OR RENT ON TIME?: NO

## 2023-12-28 SDOH — ECONOMIC STABILITY: HOUSING INSECURITY

## 2023-12-28 SDOH — ECONOMIC STABILITY: FOOD INSECURITY: WITHIN THE PAST 12 MONTHS, YOU WORRIED THAT YOUR FOOD WOULD RUN OUT BEFORE YOU GOT MONEY TO BUY MORE.: NEVER TRUE

## 2023-12-28 SDOH — ECONOMIC STABILITY: TRANSPORTATION INSECURITY
IN THE PAST 12 MONTHS, HAS LACK OF TRANSPORTATION KEPT YOU FROM MEETINGS, WORK, OR FROM GETTING THINGS NEEDED FOR DAILY LIVING?: NO

## 2023-12-28 SDOH — SOCIAL STABILITY: SOCIAL INSECURITY: HAS ANYONE EVER THREATENED TO HURT YOUR FAMILY OR YOUR PETS?: NO

## 2023-12-28 SDOH — ECONOMIC STABILITY: TRANSPORTATION INSECURITY
IN THE PAST 12 MONTHS, HAS THE LACK OF TRANSPORTATION KEPT YOU FROM MEDICAL APPOINTMENTS OR FROM GETTING MEDICATIONS?: NO

## 2023-12-28 SDOH — ECONOMIC STABILITY: HOUSING INSECURITY
IN THE LAST 12 MONTHS, WAS THERE A TIME WHEN YOU DID NOT HAVE A STEADY PLACE TO SLEEP OR SLEPT IN A SHELTER (INCLUDING NOW)?: NO

## 2023-12-28 SDOH — SOCIAL STABILITY: SOCIAL INSECURITY: ARE THERE ANY APPARENT SIGNS OF INJURIES/BEHAVIORS THAT COULD BE RELATED TO ABUSE/NEGLECT?: NO

## 2023-12-28 SDOH — ECONOMIC STABILITY: FOOD INSECURITY: WITHIN THE PAST 12 MONTHS, YOU WORRIED THAT YOUR FOOD WOULD RUN OUT BEFORE YOU GOT THE MONEY TO BUY MORE.: NEVER TRUE

## 2023-12-28 SDOH — ECONOMIC STABILITY: FOOD INSECURITY: WITHIN THE PAST 12 MONTHS, THE FOOD YOU BOUGHT JUST DIDN’T LAST AND YOU DIDN’T HAVE MONEY TO GET MORE.: NEVER TRUE

## 2023-12-28 SDOH — ECONOMIC STABILITY: FOOD INSECURITY: WITHIN THE PAST 12 MONTHS, THE FOOD YOU BOUGHT JUST DIDN'T LAST AND YOU DIDN'T HAVE MONEY TO GET MORE.: NEVER TRUE

## 2023-12-28 SDOH — SOCIAL STABILITY: SOCIAL INSECURITY: DOES ANYONE TRY TO KEEP YOU FROM HAVING/CONTACTING OTHER FRIENDS OR DOING THINGS OUTSIDE YOUR HOME?: NO

## 2023-12-28 SDOH — ECONOMIC STABILITY: HOUSING INSECURITY: IN THE PAST 12 MONTHS HAS THE ELECTRIC, GAS, OIL, OR WATER COMPANY THREATENED TO SHUT OFF SERVICES IN YOUR HOME?: NO

## 2023-12-28 SDOH — ECONOMIC STABILITY: GENERAL

## 2023-12-28 SDOH — SOCIAL STABILITY: SOCIAL INSECURITY: DO YOU FEEL UNSAFE GOING BACK TO THE PLACE WHERE YOU ARE LIVING?: NO

## 2023-12-28 SDOH — ECONOMIC STABILITY: HOUSING INSECURITY: IN THE LAST 12 MONTHS, WAS THERE A TIME WHEN YOU WERE NOT ABLE TO PAY THE MORTGAGE OR RENT ON TIME?: NO

## 2023-12-28 SDOH — ECONOMIC STABILITY: TRANSPORTATION INSECURITY: IN THE PAST 12 MONTHS, HAS LACK OF TRANSPORTATION KEPT YOU FROM MEDICAL APPOINTMENTS OR FROM GETTING MEDICATIONS?: NO

## 2023-12-28 SDOH — ECONOMIC STABILITY: FOOD INSECURITY

## 2023-12-28 SDOH — SOCIAL STABILITY: SOCIAL INSECURITY: ARE YOU OR HAVE YOU BEEN THREATENED OR ABUSED PHYSICALLY, EMOTIONALLY, OR SEXUALLY BY ANYONE?: NO

## 2023-12-28 SDOH — SOCIAL STABILITY: SOCIAL INSECURITY: ABUSE: ADULT

## 2023-12-28 SDOH — ECONOMIC STABILITY: TRANSPORTATION INSECURITY

## 2023-12-28 ASSESSMENT — ACTIVITIES OF DAILY LIVING (ADL)
HEARING - LEFT EAR: FUNCTIONAL
ADLS_ADDRESSED: NO
LACK_OF_TRANSPORTATION: NO
WALKS IN HOME: INDEPENDENT
DRESSING YOURSELF: INDEPENDENT
PATIENT'S MEMORY ADEQUATE TO SAFELY COMPLETE DAILY ACTIVITIES?: YES
FEEDING YOURSELF: INDEPENDENT
ADEQUATE_TO_COMPLETE_ADL: YES
JUDGMENT_ADEQUATE_SAFELY_COMPLETE_DAILY_ACTIVITIES: YES
ADL_ASSISTANCE: INDEPENDENT
HEARING - RIGHT EAR: FUNCTIONAL
LACK_OF_TRANSPORTATION: NO
GROOMING: INDEPENDENT
BATHING_ASSISTANCE: MODIFIED INDEPENDENT (DEVICE)
TOILETING: INDEPENDENT
BATHING: INDEPENDENT
LACK_OF_TRANSPORTATION: NO
ADL_ASSISTANCE: INDEPENDENT

## 2023-12-28 ASSESSMENT — COGNITIVE AND FUNCTIONAL STATUS - GENERAL
MOBILITY SCORE: 24
DAILY ACTIVITIY SCORE: 24
MOBILITY SCORE: 24
DAILY ACTIVITIY SCORE: 24
PATIENT BASELINE BEDBOUND: NO
MOBILITY SCORE: 23
CLIMB 3 TO 5 STEPS WITH RAILING: A LITTLE
DAILY ACTIVITIY SCORE: 24
DAILY ACTIVITIY SCORE: 24
MOBILITY SCORE: 24

## 2023-12-28 ASSESSMENT — PAIN - FUNCTIONAL ASSESSMENT
PAIN_FUNCTIONAL_ASSESSMENT: 0-10

## 2023-12-28 ASSESSMENT — PAIN SCALES - GENERAL
PAINLEVEL_OUTOF10: 8
PAINLEVEL_OUTOF10: 8
PAINLEVEL_OUTOF10: 6
PAINLEVEL_OUTOF10: 7
PAINLEVEL_OUTOF10: 5 - MODERATE PAIN

## 2023-12-28 ASSESSMENT — PATIENT HEALTH QUESTIONNAIRE - PHQ9
1. LITTLE INTEREST OR PLEASURE IN DOING THINGS: NOT AT ALL
2. FEELING DOWN, DEPRESSED OR HOPELESS: NOT AT ALL
SUM OF ALL RESPONSES TO PHQ9 QUESTIONS 1 & 2: 0

## 2023-12-28 ASSESSMENT — LIFESTYLE VARIABLES
HOW OFTEN DO YOU HAVE A DRINK CONTAINING ALCOHOL: MONTHLY OR LESS
SUBSTANCE_ABUSE_PAST_12_MONTHS: NO
SKIP TO QUESTIONS 9-10: 1
HOW MANY STANDARD DRINKS CONTAINING ALCOHOL DO YOU HAVE ON A TYPICAL DAY: 1 OR 2
AUDIT-C TOTAL SCORE: 1
PRESCIPTION_ABUSE_PAST_12_MONTHS: YES
AUDIT-C TOTAL SCORE: 1
HOW OFTEN DO YOU HAVE 6 OR MORE DRINKS ON ONE OCCASION: NEVER

## 2023-12-28 ASSESSMENT — SOCIAL DETERMINANTS OF HEALTH (SDOH): IN THE PAST 12 MONTHS, HAS THE ELECTRIC, GAS, OIL, OR WATER COMPANY THREATENED TO SHUT OFF SERVICE IN YOUR HOME?: NO

## 2023-12-28 ASSESSMENT — PAIN DESCRIPTION - LOCATION
LOCATION: BACK
LOCATION: BACK

## 2023-12-28 ASSESSMENT — PAIN DESCRIPTION - ORIENTATION: ORIENTATION: LOWER

## 2023-12-28 NOTE — ED PROVIDER NOTES
HPI   Chief Complaint   Patient presents with    Back Pain       Patient is a 27-year-old male presenting for evaluation of left hip pain and low back pain.  This been ongoing for the past 2 weeks now.  Patient states he fell in the [facility] parking lot 2 weeks ago has a history of back pain but this seems to have made it worse.  He states he now experiences left leg numbness and loses his balance and falls.  He has fallen 3 or 4 times in the last week and a half.  He states he was in the shower and his leg went numb today and he fell in the shower.  He is denying any other injuries at this time.  He did not hit his head.  No loss conscious.  He is also having severe low back pain and shooting pain down the left leg accompanying this.  He denies saddle anesthesia, incontinence, fever, IV drug use, history of malignancy.  Patient states he has tried lidocaine patches, ibuprofen, Tylenol, Flexeril, heating pads, icing without any relief.  States his leg is not actively numb at this time.  His primary doctor Yuridia advised him to come into the ER if his pain did not get better throughout the day.                        [place] Coma Scale Score: 15                  Patient History   Past Medical History:   Diagnosis Date    Anxiety 10/30/2023    Chronic GERD 10/06/2023    Elevated blood pressure reading without diagnosis of hypertension 09/27/2015    Lymphedema 10/30/2023    Mixed hyperlipidemia 09/27/2015    Vitamin D deficiency 03/15/2015     No past surgical history on file.  Family History   Problem Relation Name Age of Onset    Arthritis Mother      Allergies Mother      Thrombocytopenia Mother      No Known Problems Father       Social History     Tobacco Use    Smoking status: Not on file    Smokeless tobacco: Not on file   Substance Use Topics    Alcohol use: Not on file    Drug use: Not on file       Physical Exam   ED Triage Vitals [12/27/23 1828]   Temp Heart Rate Resp BP   36.8 °C (98.2 °F) (!) 113 14  120/72      SpO2 Temp src Heart Rate Source Patient Position   100 % -- -- --      BP Location FiO2 (%)     -- --       Physical Exam  Vitals and nursing note reviewed.   Constitutional:       General: He is not in acute distress.     Appearance: He is obese.   Cardiovascular:      Rate and Rhythm: Normal rate and regular rhythm.      Heart sounds: Normal heart sounds.   Pulmonary:      Effort: Pulmonary effort is normal.      Breath sounds: Normal breath sounds.   Musculoskeletal:         General: No deformity or signs of injury.      Comments: Intact sensation to the left lower extremity at this time.  Pulses intact.  Intact active and passive range of motion of left leg.   Neurological:      Mental Status: He is alert.   Psychiatric:         Mood and Affect: Mood normal.         Behavior: Behavior normal.         ED Course & MDM   ED Course as of 12/28/23 0107   Wed Dec 27, 2023   2103 I spoke with patient's primary care provider Dr. Shi.  We discussed patient case.  He would like the patient to stay overnight for observation and he will see him in the morning for further evaluation and care. [JJ]      ED Course User Index  [JJ] Lyndsey Farley PA-C         Diagnoses as of 12/28/23 0107   Left-sided low back pain with left-sided sciatica, unspecified chronicity       Medical Decision Making  Parts of this chart have been completed using voice recognition software. Please excuse any errors of transcription.  My thought process and reason for plan has been formulated from the time that I saw the patient until the time of disposition and is not specific to one specific moment during their visit and furthermore my MDM encompasses this entire chart and not only this text box.      HPI: Detailed above.    Exam: A medically appropriate exam performed, outlined above, given the known history and presentation.    History obtained from: Patient    Social Determinants of Health considered during this visit: Lives  independent    Medications given during visit:  Medications   ascorbic acid (Vitamin C) tablet 1,500 mg (has no administration in time range)   cholecalciferol (Vitamin D-3) tablet 4,000 Units (has no administration in time range)   cyclobenzaprine (Flexeril) tablet 10 mg (10 mg oral Given 12/28/23 0106)   pantoprazole (ProtoNix) EC tablet 40 mg (has no administration in time range)   famotidine (Pepcid) tablet 40 mg (has no administration in time range)   fluticasone (Flonase) nasal spray 2 spray (has no administration in time range)   mesalamine (Lialda) EC tablet 2.4 g (has no administration in time range)   metFORMIN (Glucophage) tablet 500 mg (has no administration in time range)   metoprolol succinate XL (Toprol-XL) 24 hr tablet 25 mg (has no administration in time range)   multivitamin 1 tablet (has no administration in time range)   ondansetron (Zofran) tablet 4 mg (has no administration in time range)   prochlorperazine (Compazine) tablet 10 mg (has no administration in time range)   sucralfate (Carafate) tablet 1 g (has no administration in time range)   traZODone (Desyrel) tablet 50 mg (50 mg oral Given 12/28/23 0106)   nitroglycerin (Nitrostat) SL tablet 0.4 mg (has no administration in time range)   buPROPion (Wellbutrin) tablet 150 mg (has no administration in time range)   acetaminophen (Tylenol) tablet 650 mg (has no administration in time range)     Or   acetaminophen (Tylenol) oral liquid 650 mg (has no administration in time range)     Or   acetaminophen (Tylenol) suppository 650 mg (has no administration in time range)   polyethylene glycol (Glycolax, Miralax) packet 17 g (has no administration in time range)   benzocaine-menthol (Cepastat Sore Throat) 15-3.6 mg lozenge 1 lozenge (has no administration in time range)   dextromethorphan-guaifenesin (Robitussin DM)  mg/5 mL oral liquid 5 mL (has no administration in time range)   guaiFENesin (Mucinex) 12 hr tablet 600 mg (has no  administration in time range)   heparin (porcine) injection 5,000 Units (5,000 Units subcutaneous Given 12/28/23 0106)   methylPREDNISolone (Medrol) tablet 24 mg (has no administration in time range)     Followed by   methylPREDNISolone (Medrol) tablet 20 mg (has no administration in time range)     Followed by   methylPREDNISolone (Medrol) tablet 16 mg (has no administration in time range)     Followed by   methylPREDNISolone (Medrol) tablet 12 mg (has no administration in time range)     Followed by   methylPREDNISolone (Medrol) tablet 8 mg (has no administration in time range)     Followed by   methylPREDNISolone (Medrol) tablet 4 mg (has no administration in time range)   ketorolac (Toradol) injection 30 mg (30 mg intramuscular Given 12/27/23 2105)   HYDROcodone-acetaminophen (Norco) 5-325 mg per tablet 1 tablet (1 tablet oral Given 12/27/23 2104)   methylPREDNISolone sod succinate (SOLU-Medrol) injection 125 mg (125 mg intravenous Given 12/28/23 0106)   ketorolac (Toradol) injection 30 mg (30 mg intravenous Given 12/28/23 0106)        Diagnostic/tests  Labs Reviewed   COMPREHENSIVE METABOLIC PANEL   CBC   URINALYSIS WITH REFLEX MICROSCOPIC      MR lumbar spine w and wo IV contrast    (Results Pending)        Considerations/further MDM:  Patient is a 27-year-old male presenting for low back pain with left-sided leg numbness.  During the ER visit the patient is alert and oriented, calm and cooperative with examiner, well-appearing.  His vital signs are within normal limits during the visit.  He does currently have sensation of his left leg at this time.  He was given Toradol and [place] for his pain.  He has no red flag symptoms at this time.  There is low suspicion for a surgical emergency such as cauda equina at this time.  I spoke with patient's primary care provider Dr. Shi we discussed patient case given the patient's continuous falls and leg numbness doc would like the patient to be admitted under  observation at this time to obtain an MRI of the lumbar spine.  I spoke with the patient and he is agreeable with the plan at this time.  Patient states his pain is improving with the Toradol and Norco.  He is stable to be admitted to the hospital under observation of Dr. Shi.      Procedure  Procedures     Lyndsey Farley PA-C  12/28/23 0115

## 2023-12-28 NOTE — PROGRESS NOTES
Physical Therapy                 Therapy Communication Note    Patient Name: Yvon Parks  MRN: [id number]  Today's Date: 12/28/2023     Discipline: Physical Therapy    Missed Visit Reason: Missed Visit Reason: Patient in a medical procedure (pt is off the floor at MRI)    Missed Time: Attempt

## 2023-12-28 NOTE — CARE PLAN
The patient's goals for the shift include sleep    The clinical goals for the shift include pain control      Problem: Pain - Adult  Goal: Verbalizes/displays adequate comfort level or baseline comfort level  Outcome: Progressing     Problem: Safety - Adult  Goal: Free from fall injury  Outcome: Progressing     Problem: Discharge Planning  Goal: Discharge to home or other facility with appropriate resources  Outcome: Progressing     Problem: Chronic Conditions and Co-morbidities  Goal: Patient's chronic conditions and co-morbidity symptoms are monitored and maintained or improved  Outcome: Progressing

## 2023-12-28 NOTE — PROGRESS NOTES
Patient is day 1 OBS after presenting to ED with back pain with associated left leg numbness and multiple falls.   Plan for MRI and PT/OT evals.  ADOD 1 day.    Met with patient at bedside to discuss discharge planning. Patient is A&OX3 from home with his mom, they reside in a two story home with 3 steps to enter and 17 steps to the lower level.  Prior to admission patient was independent in all ADL's and IADL's,  uses no DME, and still drives.  Confirmed PCP is Dr. Shi.   Discussed pending PT/OT evals, patient states he would prefer to discharge with outpatient therapy referrals [facility] [place].  TCC will continue to follow for changes in discharge planning needs.     UPDATE 1534:  PT recommending LOW level outpatient therapy, no acute OT needs.    Patient agreeable to outpatient therapy, will need referral placed on discharge.      Home with outpatient therapy  DC plan secure.  Will need outpatient PT referral placed on discharge.

## 2023-12-28 NOTE — NURSING NOTE
Assumed care of patient. Patient is asleep in bed. Call light is within reach will continue to monitor.

## 2023-12-28 NOTE — NURSING NOTE
Pt asleep. No s/s of pain/discomfort noted. Dr Shi was in to see pt earlier. No change noted from initial shift assessment. Call light in reach.

## 2023-12-28 NOTE — PROGRESS NOTES
Occupational Therapy                 Therapy Communication Note    Patient Name: Yvon Parks  MRN: [id number]  Today's Date: 12/28/2023     Discipline: Occupational Therapy    Missed Visit Reason: Missed Visit Reason:  (Pt admitted with back pain and LE numbness, awaiting MRI lumbar spine)    Missed Time: Cancel

## 2023-12-28 NOTE — PROGRESS NOTES
Yvon Parks is a 27 y.o. male on day 0 of admission presenting with Back pain associated with peripheral numbness.    Subjective   Patient seen and examined.  Resting in bed in no acute distress.  Awake alert oriented x 3.  Pain mid lower to left lower back, left hip, lower extremity, no relief, left thigh numbness, standing to void    Objective     Physical Exam  Vitals reviewed.   Constitutional:       General: He is not in acute distress.     Appearance: He is obese. He is not ill-appearing or toxic-appearing.   HENT:      Head: Normocephalic and atraumatic.      Right Ear: Tympanic membrane normal.      Left Ear: Tympanic membrane normal.      Nose: Nose normal.      Mouth/Throat:      Mouth: Mucous membranes are moist.      Pharynx: Oropharynx is clear.   Eyes:      Extraocular Movements: Extraocular movements intact.      Conjunctiva/sclera: Conjunctivae normal.      Pupils: Pupils are equal, round, and reactive to light.   Cardiovascular:      Rate and Rhythm: Normal rate and regular rhythm.      Pulses: Normal pulses.      Heart sounds: Normal heart sounds.   Pulmonary:      Effort: Pulmonary effort is normal. No respiratory distress.      Breath sounds: Normal breath sounds. No wheezing, rhonchi or rales.   Abdominal:      General: Bowel sounds are normal. There is no distension.      Palpations: Abdomen is soft.      Tenderness: There is no abdominal tenderness.   Genitourinary:     Comments: Deferred  Musculoskeletal:         General: Tenderness present. No swelling. Normal range of motion.      Cervical back: Normal range of motion and neck supple.   Skin:     General: Skin is warm and dry.      Capillary Refill: Capillary refill takes less than 2 seconds.   Neurological:      General: No focal deficit present.      Mental Status: He is alert and oriented to person, place, and time.      Sensory: Sensory deficit present.      Motor: Weakness present.      Comments: Left lower extremity weakness, left  "thigh decreased sensation    Psychiatric:         Mood and Affect: Mood normal.         Behavior: Behavior normal.       Last Recorded Vitals  Blood pressure 115/58, pulse 109, temperature 36.4 °C (97.5 °F), temperature source Oral, resp. rate 14, height 1.93 m (6' 3.98\"), weight (!) 153 kg (337 lb 7.7 oz), SpO2 96 %.    Intake/Output last 3 Shifts:  I/O last 3 completed shifts:  In: 300 (2 mL/kg) [P.O.:300]  Out: - (0 mL/kg)   Weight: 153.1 kg     Telemetry sinus tachycardia rate 1 teens    Relevant Results  Results for orders placed or performed during the hospital encounter of 12/27/23 (from the past 24 hour(s))   Comprehensive metabolic panel   Result Value Ref Range    Glucose 139 (H) 65 - 99 mg/dL    Sodium 140 133 - 145 mmol/L    Potassium 4.2 3.4 - 5.1 mmol/L    Chloride 105 97 - 107 mmol/L    Bicarbonate 22 (L) 24 - 31 mmol/L    Urea Nitrogen 18 8 - 25 mg/dL    Creatinine 0.90 0.40 - 1.60 mg/dL    eGFR >90 >60 mL/min/1.73m*2    Calcium 9.3 8.5 - 10.4 mg/dL    Albumin 4.0 3.5 - 5.0 g/dL    Alkaline Phosphatase 78 35 - 125 U/L    Total Protein 7.2 5.9 - 7.9 g/dL    AST 19 5 - 40 U/L    Bilirubin, Total 0.2 0.1 - 1.2 mg/dL    ALT 22 5 - 40 U/L    Anion Gap 13 <=19 mmol/L   CBC   Result Value Ref Range    WBC 7.9 4.4 - 11.3 x10*3/uL    nRBC 0.0 0.0 - 0.0 /100 WBCs    RBC 4.84 4.50 - 5.90 x10*6/uL    Hemoglobin 14.5 13.5 - 17.5 g/dL    Hematocrit 42.5 41.0 - 52.0 %    MCV 88 80 - 100 fL    MCH 30.0 26.0 - 34.0 pg    MCHC 34.1 32.0 - 36.0 g/dL    RDW 12.9 11.5 - 14.5 %    Platelets 254 150 - 450 x10*3/uL     MR lumbar spine wo IV contrast    Result Date: 12/28/2023  Interpreted By:  Kana Parker, STUDY: MR LUMBAR SPINE WO IV CONTRAST; 12/28/2023 10:10 am   INDICATION: Signs/Symptoms:Spinal stenosis with radicular pain   COMPARISON: None   ACCESSION NUMBER(S): [id number]   ORDERING CLINICIAN: MADISYN PAREKH   TECHNIQUE: Sagittal and axial T1 and T2-weighted sequences and sagittal gradient images were " obtained through the lumbar spine.   FINDINGS: Alignment: Gross alignment of the lumbar spine is normal in the sagittal plane.   Bone marrow signal: Signal from vertebral bodies and posterior elements is within normal limits.   Conus: The conus medullaris terminates normally at the T12-L1 level.   Paraspinal soft tissues: Normal.   Lower thoracic spine: No cord impingement.   L1-L2: Unremarkable.   L2-L3: Hypertrophic change of the facet joints. No significant spinal canal or foraminal stenosis.   L3-L4: Hypertrophic change of the facet joints without significant spinal canal or foraminal stenosis.   L4-L5: Small central disc herniation with minute tear of the annulus fibrosis posteriorly. Hypertrophic change of the facet joints. Mild spinal stenosis without significant foraminal stenosis or nerve root impingement.   L5-S1: Disc space narrowing. Mild hypertrophic change of the facet joints. No significant spinal canal or foraminal stenosis.       Small central disc herniation and minute tear of the annulus fibrosis posteriorly involving the L4-L5 disc, with mild spinal canal stenosis and no significant foraminal stenosis. Additional mild degenerative changes as above. No nerve root impingement.   Signed by: Kana Parker 12/28/2023 11:50 AM Dictation workstation:   [id number]     Scheduled medications  ascorbic acid, 1,500 mg, oral, Daily  cholecalciferol, 4,000 Units, oral, Daily  famotidine, 40 mg, oral, Daily  fluticasone, 2 spray, Each Nostril, Daily  heparin (porcine), 5,000 Units, subcutaneous, q8h RENETTA  mesalamine, 2,400 mg, oral, Daily  metFORMIN, 500 mg, oral, BID  methylPREDNISolone, 24 mg, oral, Once   Followed by  [START ON 12/29/2023] methylPREDNISolone, 20 mg, oral, Once   Followed by  [START ON 12/30/2023] methylPREDNISolone, 16 mg, oral, Once   Followed by  [START ON 12/31/2023] methylPREDNISolone, 12 mg, oral, Once   Followed by  [START ON 1/1/2024] methylPREDNISolone, 8 mg, oral, Once   Followed  by  [START ON 1/2/2024] methylPREDNISolone, 4 mg, oral, Once  metoprolol succinate XL, 25 mg, oral, Daily  multivitamin, 1 tablet, oral, Daily  pantoprazole, 40 mg, oral, Daily before breakfast  sucralfate, 1 g, oral, BID AC  traZODone, 50 mg, oral, Nightly      Continuous medications     PRN medications  PRN medications: acetaminophen **OR** acetaminophen **OR** acetaminophen, benzocaine-menthol, cyclobenzaprine, dextromethorphan-guaifenesin, guaiFENesin, nitroglycerin, ondansetron, polyethylene glycol, prochlorperazine    ASSESSMENT:  Falls  Lower back, left hip, lower extremity pain, numbness  Weakness  Abnormal MRI lumbar spine  Small central disc herniation, minute tear  Mild spinal canal stenosis  Obesity  Hyperglycemia    PLAN:  MRI lumbar spine radiology report reviewed with Dr. Shi.  Pain management.  Analgesics.  P.r.n muscle relaxants.  Medrol dose malissa.  PT/OT.  Fall precautions.  Up with assistance.  DVT prophylaxis Heparin subcutaneous.  Diet, weight loss, exercise as tolerated.  9/24/23 HgbA1c 5.4.  Monitor.  Supportive care.  Patient reassured.  Case management following for discharge planning.  Anticipate discharge.    Hiral Boyer, APRN-CNP

## 2023-12-28 NOTE — PROGRESS NOTES
Occupational Therapy    Evaluation  Patient Name: Yvon Parks  MRN: [id number]  : 1995  Today's Date: 23  Time Calculation  Start Time: 1321  Stop Time: 1344  Time Calculation (min): 23 min       Assessment:  OT Assessment:  (Pt demonstrated indep/mod I with ADL tasks and  functional mobility this date. Pt declines the need for further skilled acute OT services. Will d/c OT)  Prognosis: Good  End of Session Communication: Bedside nurse  End of Session Patient Position: Bed, 2 rail up, Alarm off, not on at start of session (all needs in reach)  Prognosis: Good  Strengths: Ability to acquire knowledge, Capable of completing ADLs semi/independent, Living arrangement secure, Support of extended family/friends  Plan:  No Skilled OT: No acute OT goals identified  OT Discharge Recommendations: No further acute OT  OT - OK to Discharge: Yes       Subjective     General:   OT Received On: 23  General  Reason for Referral: impaired ADLs; Pt admitted with c/o left hip pain, LLE numbness, and low back pain. Pt reports multiple recent falls  Referred By: Dr. Shi  Past Medical History Relevant to Rehab: Anxiety, GERD, lymphedema, HLD,Cervical radiculopathy, IBS  Missed Visit: No  Missed Visit Reason: Other (Comment)  Family/Caregiver Present: No  Co-Treatment: PT  Prior to Session Communication: Bedside nurse  Patient Position Received: Bed, 2 rail up, Alarm off, not on at start of session  General Comment: Cleared by nursing. Pt pleasant and agreeable to therapy  Precautions:  Medical Precautions: Fall precautions    Pain:  Pain Assessment  Pain Assessment: 0-10  Pain Score: 8  Pain Type: Acute pain  Pain Location: Back  Pain Orientation: Lower    Objective   Cognition:  Overall Cognitive Status: Within Functional Limits           Home Living:  Type of Home: House  Lives With: Parent(s) (Mother)  Home Layout: Two level, Laundry in basement, Full bath main level, Able to live on main level with  bedroom/bathroom, Stairs to alternate level with rails  Alternate Level Stairs-Rails:  (unilateral)  Alternate Level Stairs-Number of Steps: 15 (basement)  Home Access: Stairs to enter with rails  Entrance Stairs-Rails: Both  Entrance Stairs-Number of Steps: 3  Bathroom Shower/Tub: Tub/shower unit  Prior Function:  Level of [place]: Independent with ADLs and functional transfers, Independent with homemaking with ambulation  Receives Help From: Family  ADL Assistance: Independent  Homemaking Assistance: Independent  Driving/Transportation: Independent  Ambulatory Assistance: Independent  Vocational: Full time employment (RN)  Hand Dominance: Right       ADL:  Eating Assistance: Independent  Grooming Assistance: Independent  Bathing Assistance: Modified independent (Device)  UE Dressing Assistance: Independent  LE Dressing Assistance: Modified independent (Device)  Toileting Assistance with Device: Modified independent  Activities of Daily Living:       LE Dressing  LE Dressing: Yes  Sock Level of Assistance: Modified independent  LE Dressing Where Assessed: Edge of bed  LE Dressing Comments: donning/doffing socks       Bed Mobility/Transfers: Bed Mobility  Bed Mobility:  (mod I supine<>seated EOB)    Transfers  Transfer:  (indep sit<>stand bed level)       Therapy/Activity: Therapeutic Activity  Therapeutic Activity Performed:  (indep functional mobility 6-7 steps, no LOB noted)    Vision:Vision - Basic Assessment  Current Vision: Does not wear glasses  Sensation:  Sensation Comment: pt denied numbness/paresthesia BUE  Strength:  Strength Comments: WFL during functional activity    Hand Function:  Hand Function  Gross Grasp: Functional  Coordination: Functional  Extremities: RUE   RUE : Within Functional Limits and LUE   LUE: Within Functional Limits      Outcome Measures: [facility] Daily Activity  Putting on and taking off regular lower body clothing: None  Bathing (including washing, rinsing, drying):  None  Putting on and taking off regular upper body clothing: None  Toileting, which includes using toilet, bedpan or urinal: None  Taking care of personal grooming such as brushing teeth: None  Eating Meals: None  Daily Activity - Total Score: 24        Education Documentation  ADL Training, taught by Melani Rowley OT at 12/28/2023  3:01 PM.  Learner: Patient  Readiness: Acceptance  Method: Explanation, Demonstration  Response: Verbalizes Understanding    Education Comments  No comments found.

## 2023-12-29 VITALS
HEIGHT: 76 IN | RESPIRATION RATE: 16 BRPM | HEART RATE: 104 BPM | TEMPERATURE: 97.9 F | WEIGHT: 315 LBS | SYSTOLIC BLOOD PRESSURE: 113 MMHG | BODY MASS INDEX: 38.36 KG/M2 | DIASTOLIC BLOOD PRESSURE: 51 MMHG | OXYGEN SATURATION: 97 %

## 2023-12-29 LAB
EST. AVERAGE GLUCOSE BLD GHB EST-MCNC: 105 MG/DL
HBA1C MFR BLD: 5.3 %

## 2023-12-29 PROCEDURE — 2500000004 HC RX 250 GENERAL PHARMACY W/ HCPCS (ALT 636 FOR OP/ED): Performed by: INTERNAL MEDICINE

## 2023-12-29 PROCEDURE — 96372 THER/PROPH/DIAG INJ SC/IM: CPT | Performed by: INTERNAL MEDICINE

## 2023-12-29 PROCEDURE — 2500000001 HC RX 250 WO HCPCS SELF ADMINISTERED DRUGS (ALT 637 FOR MEDICARE OP): Performed by: INTERNAL MEDICINE

## 2023-12-29 PROCEDURE — 2500000002 HC RX 250 W HCPCS SELF ADMINISTERED DRUGS (ALT 637 FOR MEDICARE OP, ALT 636 FOR OP/ED): Performed by: INTERNAL MEDICINE

## 2023-12-29 PROCEDURE — G0378 HOSPITAL OBSERVATION PER HR: HCPCS

## 2023-12-29 RX ORDER — METHYLPREDNISOLONE 4 MG/1
TABLET ORAL
Qty: 21 TABLET | Refills: 0 | Status: SHIPPED | OUTPATIENT
Start: 2023-12-29 | End: 2024-01-05

## 2023-12-29 RX ORDER — METHYLPREDNISOLONE 16 MG/1
16 TABLET ORAL ONCE
Qty: 1 TABLET | Refills: 0 | Status: SHIPPED | OUTPATIENT
Start: 2023-12-30 | End: 2023-12-30

## 2023-12-29 RX ORDER — HYDROCODONE BITARTRATE AND ACETAMINOPHEN 5; 325 MG/1; MG/1
1 TABLET ORAL EVERY 6 HOURS PRN
Qty: 20 TABLET | Refills: 0 | Status: SHIPPED | OUTPATIENT
Start: 2023-12-29

## 2023-12-29 RX ORDER — METHYLPREDNISOLONE 4 MG/1
20 TABLET ORAL ONCE
Qty: 5 TABLET | Refills: 0 | Status: SHIPPED | OUTPATIENT
Start: 2023-12-29 | End: 2023-12-29

## 2023-12-29 RX ORDER — METHYLPREDNISOLONE 8 MG/1
8 TABLET ORAL ONCE
Qty: 1 TABLET | Refills: 0 | Status: SHIPPED | OUTPATIENT
Start: 2024-01-01 | End: 2024-01-01

## 2023-12-29 RX ORDER — METHYLPREDNISOLONE 4 MG/1
12 TABLET ORAL ONCE
Qty: 3 TABLET | Refills: 0 | Status: SHIPPED | OUTPATIENT
Start: 2023-12-31 | End: 2023-12-31

## 2023-12-29 RX ADMIN — METOPROLOL SUCCINATE 25 MG: 100 TABLET, EXTENDED RELEASE ORAL at 09:33

## 2023-12-29 RX ADMIN — MULTIVITAMIN TABLET 1 TABLET: TABLET at 09:33

## 2023-12-29 RX ADMIN — SUCRALFATE 1 G: 1 TABLET ORAL at 06:22

## 2023-12-29 RX ADMIN — FAMOTIDINE 40 MG: 20 TABLET ORAL at 09:33

## 2023-12-29 RX ADMIN — ACETAMINOPHEN 650 MG: 325 TABLET ORAL at 09:32

## 2023-12-29 RX ADMIN — PANTOPRAZOLE SODIUM 40 MG: 40 TABLET, DELAYED RELEASE ORAL at 06:22

## 2023-12-29 RX ADMIN — FLUTICASONE PROPIONATE 2 SPRAY: 50 SPRAY, METERED NASAL at 09:33

## 2023-12-29 RX ADMIN — OXYCODONE HYDROCHLORIDE AND ACETAMINOPHEN 1500 MG: 500 TABLET ORAL at 09:33

## 2023-12-29 RX ADMIN — MESALAMINE 2.4 G: 800 TABLET, DELAYED RELEASE ORAL at 09:33

## 2023-12-29 RX ADMIN — TRAZODONE HYDROCHLORIDE 50 MG: 50 TABLET ORAL at 00:01

## 2023-12-29 RX ADMIN — HEPARIN SODIUM 5000 UNITS: 5000 INJECTION, SOLUTION INTRAVENOUS; SUBCUTANEOUS at 06:22

## 2023-12-29 RX ADMIN — METFORMIN HYDROCHLORIDE 500 MG: 500 TABLET, FILM COATED ORAL at 09:33

## 2023-12-29 RX ADMIN — Medication 4000 UNITS: at 06:22

## 2023-12-29 RX ADMIN — CYCLOBENZAPRINE 10 MG: 10 TABLET, FILM COATED ORAL at 09:33

## 2023-12-29 ASSESSMENT — COGNITIVE AND FUNCTIONAL STATUS - GENERAL
DAILY ACTIVITIY SCORE: 24
MOBILITY SCORE: 24

## 2023-12-29 ASSESSMENT — PAIN - FUNCTIONAL ASSESSMENT
PAIN_FUNCTIONAL_ASSESSMENT: 0-10
PAIN_FUNCTIONAL_ASSESSMENT: 0-10

## 2023-12-29 ASSESSMENT — PAIN DESCRIPTION - LOCATION: LOCATION: BACK

## 2023-12-29 ASSESSMENT — PAIN DESCRIPTION - ORIENTATION: ORIENTATION: MID

## 2023-12-29 ASSESSMENT — PAIN SCALES - GENERAL
PAINLEVEL_OUTOF10: 6
PAINLEVEL_OUTOF10: 2

## 2023-12-29 NOTE — NURSING NOTE
No change from previous assessment. Pt resting comfortably in bed with call light in reach. Back pain still noted.

## 2023-12-29 NOTE — CARE PLAN
Pt is being discharged home today; pt was admitted under obs status for back pain. 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   He is independent with ADL's. He denies home going needs; declined Home PT; pt said that his mother will assist him at home.      DISCHARGE PLAN--HOME AND MOTHER TO ASSIST

## 2023-12-29 NOTE — H&P
History Of Present Illness  Yvon Parks is a 27 y.o. male presenting with low back pain and inability ambulate.  Patient's symptoms started few days prior to the admission and got worse.  It came to the point where patient was not able to breathe.  With this complaint he came to the emergency room and emergency room initial workup was done and patient has been admitted to for further management.  At the time of examination patient denies any fever chills diarrhea.  Patient said the pain is present in the low back and radiating to the left leg.  He was not able to walk or stand on the leg.  Patient denies any fever chills or rigor.  No incontinence of urine or stool.  No retention of urine or stool..     Past Medical History  Past Medical History:   Diagnosis Date    Anxiety 10/30/2023    Arthritis     Chronic GERD 10/06/2023    Elevated blood pressure reading without diagnosis of hypertension 09/27/2015    Lymphedema 10/30/2023    Mixed hyperlipidemia 09/27/2015    Vitamin D deficiency 03/15/2015   COVID-19    Surgical History  History reviewed. No pertinent surgical history.     Social History  He reports that he has quit smoking. His smoking use included cigarettes. He smoked an average of .5 packs per day. He has never used smokeless tobacco. He reports that he does not currently use alcohol after a past usage of about 1.0 standard drink of alcohol per week. He reports current drug use. Drug: Other.    Family History  Family History   Problem Relation Name Age of Onset    Arthritis Mother      Allergies Mother      Thrombocytopenia Mother      No Known Problems Father          Allergies  Levofloxacin and Mold    Review of Systems  I reviewed all systems reviewed as above otherwise is negative.  Physical Exam  HEENT:  Head externally atraumatic, no pallor, no icterus, extraocular movements intact, pupils reactive to light, oral mucosa moist and throat clear.  Neck:  Supple, no JVP, no palpable adenopathy or  "thyromegaly.  No carotid bruit.  Chest:  Clear to auscultation and resonant.  Heart:  Regular rate and rhythm, no murmur or gallop could be appreciated.  Abdomen:  Soft, nontender, bowel sounds present, normoactive, no palpable hepatosplenomegaly.  Extremities: Mild edema, pulses present, no cyanosis or clubbing.  CNS:  Patient alert, oriented to time, place and person.  Power 5/5 all over and deep tendon reflexes symmetrical, cranial nerves 2-12 grossly intact.  Straight leg raising was positive on the left side.  Sensation was slightly decreased on the left leg on posterior lateral thigh  Skin:  No active rash.  Musculoskeletal:  No joint swelling or erythema, range of movement normal.  Last Recorded Vitals  Heart Rate:  []   Temp:  [36.2 °C (97.2 °F)-36.7 °C (98 °F)]   Resp:  [14-16]   BP: (105-120)/(42-67)   Height:  [193 cm (6' 3.98\")]   Weight:  [153 kg (337 lb 7.7 oz)]   SpO2:  [93 %-97 %]       Relevant Results        Results for orders placed or performed during the hospital encounter of 12/27/23 (from the past 24 hour(s))   Comprehensive metabolic panel   Result Value Ref Range    Glucose 139 (H) 65 - 99 mg/dL    Sodium 140 133 - 145 mmol/L    Potassium 4.2 3.4 - 5.1 mmol/L    Chloride 105 97 - 107 mmol/L    Bicarbonate 22 (L) 24 - 31 mmol/L    Urea Nitrogen 18 8 - 25 mg/dL    Creatinine 0.90 0.40 - 1.60 mg/dL    eGFR >90 >60 mL/min/1.73m*2    Calcium 9.3 8.5 - 10.4 mg/dL    Albumin 4.0 3.5 - 5.0 g/dL    Alkaline Phosphatase 78 35 - 125 U/L    Total Protein 7.2 5.9 - 7.9 g/dL    AST 19 5 - 40 U/L    Bilirubin, Total 0.2 0.1 - 1.2 mg/dL    ALT 22 5 - 40 U/L    Anion Gap 13 <=19 mmol/L   CBC   Result Value Ref Range    WBC 7.9 4.4 - 11.3 x10*3/uL    nRBC 0.0 0.0 - 0.0 /100 WBCs    RBC 4.84 4.50 - 5.90 x10*6/uL    Hemoglobin 14.5 13.5 - 17.5 g/dL    Hematocrit 42.5 41.0 - 52.0 %    MCV 88 80 - 100 fL    MCH 30.0 26.0 - 34.0 pg    MCHC 34.1 32.0 - 36.0 g/dL    RDW 12.9 11.5 - 14.5 %    Platelets 254 150 " - 450 x10*3/uL   Urinalysis with Reflex Microscopic   Result Value Ref Range    Color, Urine Yellow Light-Yellow, Yellow, Dark-Yellow    Appearance, Urine Clear Clear    Specific Gravity, Urine 1.029 1.005 - 1.035    pH, Urine 5.5 5.0, 5.5, 6.0, 6.5, 7.0, 7.5, 8.0    Protein, Urine NEGATIVE NEGATIVE, 10 (TRACE), 20 (TRACE) mg/dL    Glucose, Urine 500 (3+) (A) Normal mg/dL    Blood, Urine NEGATIVE NEGATIVE    Ketones, Urine NEGATIVE NEGATIVE mg/dL    Bilirubin, Urine NEGATIVE NEGATIVE    Urobilinogen, Urine Normal Normal mg/dL    Nitrite, Urine NEGATIVE NEGATIVE    Leukocyte Esterase, Urine NEGATIVE NEGATIVE     Prior to Admission medications    Medication Sig Start Date End Date Taking? Authorizing Provider   ascorbic acid (Vitamin C) 500 mg tablet Take 3 tablets (1,500 mg) by mouth once daily.    Historical Provider, MD   azithromycin (Zithromax) 500 mg tablet TAKE 1 TABLET BY MOUTH DAILY  FOR 5 DAYS 11/13/23   Guille Shi MD   cholecalciferol (Vitamin D-3) 50 mcg (2,000 unit) capsule Take 2 capsules (4,000 Units) by mouth early in the morning.. 6/28/21   Historical Provider, MD   cyclobenzaprine (Flexeril) 10 mg tablet Take 1 tablet (10 mg) by mouth 2 times a day as needed. 12/5/19   Historical Provider, MD   dexAMETHasone (Decadron) 6 mg tablet TAKE 1 TABLET BY MOUTH ONCE DAILY FOR 10 DAYS 11/13/23   Guille Shi MD   dicyclomine (Bentyl) 20 mg tablet Take 1 tablet (20 mg) by mouth 4 times a day as needed (abdominal pain). 2/23/21   Historical Provider, MD   esomeprazole (NexIUM) 40 mg DR capsule Take 1 capsule (40 mg) by mouth once daily in the morning. Take before meals. 11/3/20   Historical Provider, MD   famotidine (Pepcid) 40 mg tablet Take 1 tablet (40 mg) by mouth once daily.    Historical Provider, MD   fluticasone (Flonase) 50 mcg/actuation nasal spray Administer 2 sprays into each nostril once daily. 12/14/21   Historical Provider, MD   mesalamine (Lialda) 1.2 gram EC tablet Take 2  tablets (2.4 g) by mouth once daily. 3/14/18   Historical Provider, MD   multivitamin tablet Take 1 tablet by mouth once daily.    Historical Provider, MD   nirmatrelvir-ritonavir (Paxlovid) 300 mg (150 mg x 2)-100 mg tablet therapy pack TAKE 3 TABLETS BY MOUTH TWICE DAILY FOR 5 DAYS 11/13/23   Guille Shi MD   nitroglycerin (Nitrostat) 0.4 mg SL tablet 1 tablet (0.4 mg) every 5 minutes if needed for chest pain. 9/23/23   Historical Provider, MD   ondansetron (Zofran) 4 mg tablet Take 1 tablet (4 mg) by mouth. 10/6/23   Historical Provider, MD   ondansetron ODT (Zofran-ODT) 8 mg disintegrating tablet Take 1 tablet (8 mg) by mouth every 8 hours if needed for nausea or vomiting. 4/2/22   Historical Provider, MD   pantoprazole (ProtoNix) 40 mg EC tablet Take 1 tablet (40 mg) by mouth once daily.    Historical Provider, MD   sucralfate (Carafate) 1 gram tablet Take 1 tablet (1 g) by mouth 2 times a day before meals.    Historical Provider, MD   traZODone (Desyrel) 50 mg tablet Take 1-2 tablets ( mg) by mouth once daily at bedtime.    Historical Provider, MD   buPROPion (Zyban) 150 mg 12 hr tablet Take 1 tablet (150 mg) by mouth 2 times a day.  12/28/23  Historical Provider, MD   hydrOXYzine pamoate (Vistaril) 25 mg capsule Take 1 capsule (25 mg) by mouth 3 times a day as needed for anxiety. 9/27/23 12/28/23  Historical Provider, MD   metFORMIN (Glucophage) 500 mg tablet Take 1 tablet (500 mg) by mouth 2 times a day. 8/8/23 12/28/23  Historical Provider, MD   metoprolol succinate XL (Toprol-XL) 25 mg 24 hr tablet Take 1 tablet (25 mg) by mouth once daily.  Take 1 tablet by mouth once daily HOLD FOR HEART RATE LESS THAN 55 BPM AND/OR SBP LESS THAN 90 9/27/23 12/28/23  Historical Provider, MD   oxyCODONE-acetaminophen (Percocet) 5-325 mg tablet Take 1 tablet by mouth every 6 hours if needed for severe pain (7 - 10) or moderate pain (4 - 6).  12/28/23  Historical Provider, MD   prochlorperazine (Compazine) 10  mg tablet Take 1 tablet (10 mg) by mouth every 6 hours if needed for nausea or vomiting.  12/28/23  Historical Provider, MD       Current Facility-Administered Medications:     acetaminophen (Tylenol) tablet 650 mg, 650 mg, oral, q4h PRN, 650 mg at 12/28/23 1723 **OR** acetaminophen (Tylenol) oral liquid 650 mg, 650 mg, oral, q4h PRN **OR** acetaminophen (Tylenol) suppository 650 mg, 650 mg, rectal, q4h PRN, Guille Shi MD    ascorbic acid (Vitamin C) tablet 1,500 mg, 1,500 mg, oral, Daily, Guille Shi MD, 1,500 mg at 12/28/23 0907    benzocaine-menthol (Cepastat Sore Throat) 15-3.6 mg lozenge 1 lozenge, 1 lozenge, Mouth/Throat, q2h PRN, Guille Shi MD    cholecalciferol (Vitamin D-3) tablet 4,000 Units, 4,000 Units, oral, Daily, Guille Shi MD, 4,000 Units at 12/28/23 0519    cyclobenzaprine (Flexeril) tablet 10 mg, 10 mg, oral, BID PRN, Guille Shi MD, 10 mg at 12/28/23 1721    dextromethorphan-guaifenesin (Robitussin DM)  mg/5 mL oral liquid 5 mL, 5 mL, oral, q4h PRN, Guille Shi MD    diclofenac sodium (Voltaren) 1 % gel 1 Application, 4 g, Topical, 4x daily PRN, Guille Shi MD    famotidine (Pepcid) tablet 40 mg, 40 mg, oral, Daily, Guille Shi MD, 40 mg at 12/28/23 0901    fluticasone (Flonase) nasal spray 2 spray, 2 spray, Each Nostril, Daily, Guille Shi MD, 2 spray at 12/28/23 0900    guaiFENesin (Mucinex) 12 hr tablet 600 mg, 600 mg, oral, q12h PRN, Guille Shi MD    heparin (porcine) injection 5,000 Units, 5,000 Units, subcutaneous, q8h RENETTA, Guille Shi MD, 5,000 Units at 12/28/23 2102    mesalamine (Lialda) EC tablet 2.4 g, 2,400 mg, oral, Daily, Guille Shi MD, 2.4 g at 12/28/23 0901    metFORMIN (Glucophage) tablet 500 mg, 500 mg, oral, BID, Guille Shi MD, 500 mg at 12/28/23 2102    [COMPLETED] methylPREDNISolone (Medrol) tablet 24 mg, 24 mg, oral, Once, 24 mg at 12/28/23 2101 **FOLLOWED BY**  [START ON 12/29/2023] methylPREDNISolone (Medrol) tablet 20 mg, 20 mg, oral, Once **FOLLOWED BY** [START ON 12/30/2023] methylPREDNISolone (Medrol) tablet 16 mg, 16 mg, oral, Once **FOLLOWED BY** [START ON 12/31/2023] methylPREDNISolone (Medrol) tablet 12 mg, 12 mg, oral, Once **FOLLOWED BY** [START ON 1/1/2024] methylPREDNISolone (Medrol) tablet 8 mg, 8 mg, oral, Once **FOLLOWED BY** [START ON 1/2/2024] methylPREDNISolone (Medrol) tablet 4 mg, 4 mg, oral, Once, Madisyn Parekh MD    metoprolol succinate XL (Toprol-XL) 24 hr tablet 25 mg, 25 mg, oral, Daily, Madisyn Parekh MD, 25 mg at 12/28/23 0901    morphine injection 2 mg, 2 mg, intravenous, q4h PRN, Madisyn Parekh MD, 2 mg at 12/28/23 2102    multivitamin 1 tablet, 1 tablet, oral, Daily, Madisyn Parekh MD, 1 tablet at 12/28/23 0901    nitroglycerin (Nitrostat) SL tablet 0.4 mg, 0.4 mg, sublingual, q5 min PRN, Madisyn Parekh MD    ondansetron (Zofran) tablet 4 mg, 4 mg, oral, q4h PRN, Madisyn Parekh MD    pantoprazole (ProtoNix) EC tablet 40 mg, 40 mg, oral, Daily before breakfast, Madisyn Parekh MD, 40 mg at 12/28/23 0621    polyethylene glycol (Glycolax, Miralax) packet 17 g, 17 g, oral, Daily PRN, Madisyn Parekh MD    prochlorperazine (Compazine) tablet 10 mg, 10 mg, oral, q6h PRN, Madisyn Parekh MD    sucralfate (Carafate) tablet 1 g, 1 g, oral, BID AC, Madisyn Parekh MD, 1 g at 12/28/23 1720    traZODone (Desyrel) tablet 50 mg, 50 mg, oral, Nightly, Madisyn Parekh MD, 50 mg at 12/28/23 0106  MR lumbar spine wo IV contrast    Result Date: 12/28/2023  Interpreted By:  Kana Parker, STUDY: MR LUMBAR SPINE WO IV CONTRAST; 12/28/2023 10:10 am   INDICATION: Signs/Symptoms:Spinal stenosis with radicular pain   COMPARISON: None   ACCESSION NUMBER(S): [id number]   ORDERING CLINICIAN: MADISYN PAREKH   TECHNIQUE: Sagittal and axial T1 and T2-weighted sequences and sagittal gradient images were obtained  through the lumbar spine.   FINDINGS: Alignment: Gross alignment of the lumbar spine is normal in the sagittal plane.   Bone marrow signal: Signal from vertebral bodies and posterior elements is within normal limits.   Conus: The conus medullaris terminates normally at the T12-L1 level.   Paraspinal soft tissues: Normal.   Lower thoracic spine: No cord impingement.   L1-L2: Unremarkable.   L2-L3: Hypertrophic change of the facet joints. No significant spinal canal or foraminal stenosis.   L3-L4: Hypertrophic change of the facet joints without significant spinal canal or foraminal stenosis.   L4-L5: Small central disc herniation with minute tear of the annulus fibrosis posteriorly. Hypertrophic change of the facet joints. Mild spinal stenosis without significant foraminal stenosis or nerve root impingement.   L5-S1: Disc space narrowing. Mild hypertrophic change of the facet joints. No significant spinal canal or foraminal stenosis.       Small central disc herniation and minute tear of the annulus fibrosis posteriorly involving the L4-L5 disc, with mild spinal canal stenosis and no significant foraminal stenosis. Additional mild degenerative changes as above. No nerve root impingement.   Signed by: Kana Parker 12/28/2023 11:50 AM Dictation workstation:   [id number]    No results found for the last 90 days.       Assessment/Plan   Principal Problem:    Back pain associated with peripheral numbness  Hypertension  History of COVID-19 infection  No back pain  Nausea and vomiting    Plan: Continue current medication.  Supportive care.  Physical therapy and Occupational Therapy.  Monitor CBC BMP.  Patient has got pain in the back which is radiating to the left leg.  Patient is a numbness in the lower posterior lateral upper thigh.  Found the finding it look like patient has got normal impingement at L4-L5 or L5-S1 level.  Check MRI of the lumbosacral spine.  Physical therapy Occupational Therapy.  Patient was given  Solu-Medrol and Toradol.  Control pain with narcotic analgesics.  Patient has been started on Medrol Dosepak.  Give muscle traction.  If pain does not get better patient will be referred to either pain management or spinal surgery depending upon the MRI report.        Guille Shi MD

## 2023-12-29 NOTE — CARE PLAN
The patient's goals for the shift include sleep    The clinical goals for the shift include Pain control    Over the shift, the patient did not make progress toward the following goals. Barriers to progression include . Recommendations to address these barriers include .

## 2023-12-29 NOTE — CARE PLAN
The patient's goals for the shift include sleep    The clinical goals for the shift include Pain control      Problem: Pain - Adult  Goal: Verbalizes/displays adequate comfort level or baseline comfort level  Outcome: Progressing     Problem: Safety - Adult  Goal: Free from fall injury  Outcome: Met     Problem: Discharge Planning  Goal: Discharge to home or other facility with appropriate resources  Outcome: Met     Problem: Chronic Conditions and Co-morbidities  Goal: Patient's chronic conditions and co-morbidity symptoms are monitored and maintained or improved  Outcome: Met

## 2023-12-29 NOTE — DISCHARGE INSTRUCTIONS
If you have any questions, please contact Dr. Shi's office at [phone].    Schedule appointment with pain management

## 2023-12-29 NOTE — PROGRESS NOTES
Yvon Parks is a 27 y.o. male on day 0 of admission presenting with Back pain associated with peripheral numbness.    Subjective   Patient seen and examined.  Resting in bed in no acute distress.  Awake alert oriented x 3.  Better.  Pain mid lower to left lower back into left hip improved, Morphine x 1 dose, intermittent left thigh numbness, resolved.  Increased ambulation    Objective     Physical Exam  Vitals reviewed.   Constitutional:       General: He is not in acute distress.     Appearance: He is obese. He is not ill-appearing or toxic-appearing.   HENT:      Head: Normocephalic and atraumatic.      Right Ear: Tympanic membrane normal.      Left Ear: Tympanic membrane normal.      Nose: Nose normal.      Mouth/Throat:      Mouth: Mucous membranes are moist.      Pharynx: Oropharynx is clear.   Eyes:      Extraocular Movements: Extraocular movements intact.      Conjunctiva/sclera: Conjunctivae normal.      Pupils: Pupils are equal, round, and reactive to light.   Cardiovascular:      Rate and Rhythm: Normal rate and regular rhythm.      Pulses: Normal pulses.      Heart sounds: Normal heart sounds.   Pulmonary:      Effort: Pulmonary effort is normal. No respiratory distress.      Breath sounds: Normal breath sounds. No wheezing, rhonchi or rales.   Abdominal:      General: Bowel sounds are normal. There is no distension.      Palpations: Abdomen is soft.      Tenderness: There is no abdominal tenderness.   Genitourinary:     Comments: Deferred  Musculoskeletal:         General: Tenderness present. No swelling. Normal range of motion.      Cervical back: Normal range of motion and neck supple.      Comments: Lower back, left hip tenderness improved   Skin:     General: Skin is warm and dry.      Capillary Refill: Capillary refill takes less than 2 seconds.   Neurological:      General: No focal deficit present.      Mental Status: He is alert and oriented to person, place, and time.      Sensory: Sensory  "deficit present.      Motor: Weakness present.      Comments: Left lower extremity weakness, left thigh decreased sensation. Improved    Psychiatric:         Mood and Affect: Mood normal.         Behavior: Behavior normal.       Last Recorded Vitals  Blood pressure 113/51, pulse 104, temperature 36.6 °C (97.9 °F), temperature source Oral, resp. rate 16, height 1.93 m (6' 3.98\"), weight (!) 153 kg (337 lb 7.7 oz), SpO2 97 %.    Intake/Output last 3 Shifts:  I/O last 3 completed shifts:  In: 2000 (13.1 mL/kg) [P.O.:2000]  Out: 2050 (13.4 mL/kg) [Urine:2050 (0.4 mL/kg/hr)]  Weight: 153.1 kg     Telemetry normal sinus rhythm rate 90's    Relevant Results  Results for orders placed or performed during the hospital encounter of 12/27/23 (from the past 96 hour(s))   Comprehensive metabolic panel   Result Value Ref Range    Glucose 139 (H) 65 - 99 mg/dL    Sodium 140 133 - 145 mmol/L    Potassium 4.2 3.4 - 5.1 mmol/L    Chloride 105 97 - 107 mmol/L    Bicarbonate 22 (L) 24 - 31 mmol/L    Urea Nitrogen 18 8 - 25 mg/dL    Creatinine 0.90 0.40 - 1.60 mg/dL    eGFR >90 >60 mL/min/1.73m*2    Calcium 9.3 8.5 - 10.4 mg/dL    Albumin 4.0 3.5 - 5.0 g/dL    Alkaline Phosphatase 78 35 - 125 U/L    Total Protein 7.2 5.9 - 7.9 g/dL    AST 19 5 - 40 U/L    Bilirubin, Total 0.2 0.1 - 1.2 mg/dL    ALT 22 5 - 40 U/L    Anion Gap 13 <=19 mmol/L   CBC   Result Value Ref Range    WBC 7.9 4.4 - 11.3 x10*3/uL    nRBC 0.0 0.0 - 0.0 /100 WBCs    RBC 4.84 4.50 - 5.90 x10*6/uL    Hemoglobin 14.5 13.5 - 17.5 g/dL    Hematocrit 42.5 41.0 - 52.0 %    MCV 88 80 - 100 fL    MCH 30.0 26.0 - 34.0 pg    MCHC 34.1 32.0 - 36.0 g/dL    RDW 12.9 11.5 - 14.5 %    Platelets 254 150 - 450 x10*3/uL   Urinalysis with Reflex Microscopic   Result Value Ref Range    Color, Urine Yellow Light-Yellow, Yellow, Dark-Yellow    Appearance, Urine Clear Clear    Specific Gravity, Urine 1.029 1.005 - 1.035    pH, Urine 5.5 5.0, 5.5, 6.0, 6.5, 7.0, 7.5, 8.0    Protein, Urine " NEGATIVE NEGATIVE, 10 (TRACE), 20 (TRACE) mg/dL    Glucose, Urine 500 (3+) (A) Normal mg/dL    Blood, Urine NEGATIVE NEGATIVE    Ketones, Urine NEGATIVE NEGATIVE mg/dL    Bilirubin, Urine NEGATIVE NEGATIVE    Urobilinogen, Urine Normal Normal mg/dL    Nitrite, Urine NEGATIVE NEGATIVE    Leukocyte Esterase, Urine NEGATIVE NEGATIVE     MR lumbar spine wo IV contrast    Result Date: 12/28/2023  Interpreted By:  Kana Parker, STUDY: MR LUMBAR SPINE WO IV CONTRAST; 12/28/2023 10:10 am   INDICATION: Signs/Symptoms:Spinal stenosis with radicular pain   COMPARISON: None   ACCESSION NUMBER(S): [id number]   ORDERING CLINICIAN: MADISYN PAREKH   TECHNIQUE: Sagittal and axial T1 and T2-weighted sequences and sagittal gradient images were obtained through the lumbar spine.   FINDINGS: Alignment: Gross alignment of the lumbar spine is normal in the sagittal plane.   Bone marrow signal: Signal from vertebral bodies and posterior elements is within normal limits.   Conus: The conus medullaris terminates normally at the T12-L1 level.   Paraspinal soft tissues: Normal.   Lower thoracic spine: No cord impingement.   L1-L2: Unremarkable.   L2-L3: Hypertrophic change of the facet joints. No significant spinal canal or foraminal stenosis.   L3-L4: Hypertrophic change of the facet joints without significant spinal canal or foraminal stenosis.   L4-L5: Small central disc herniation with minute tear of the annulus fibrosis posteriorly. Hypertrophic change of the facet joints. Mild spinal stenosis without significant foraminal stenosis or nerve root impingement.   L5-S1: Disc space narrowing. Mild hypertrophic change of the facet joints. No significant spinal canal or foraminal stenosis.       Small central disc herniation and minute tear of the annulus fibrosis posteriorly involving the L4-L5 disc, with mild spinal canal stenosis and no significant foraminal stenosis. Additional mild degenerative changes as above. No nerve root  impingement.   Signed by: Kana Parker 12/28/2023 11:50 AM Dictation workstation:   [id number]     Scheduled medications  ascorbic acid, 1,500 mg, oral, Daily  cholecalciferol, 4,000 Units, oral, Daily  famotidine, 40 mg, oral, Daily  fluticasone, 2 spray, Each Nostril, Daily  heparin (porcine), 5,000 Units, subcutaneous, q8h RENETTA  mesalamine, 2,400 mg, oral, Daily  metFORMIN, 500 mg, oral, BID  methylPREDNISolone, 20 mg, oral, Once   Followed by  [START ON 12/30/2023] methylPREDNISolone, 16 mg, oral, Once   Followed by  [START ON 12/31/2023] methylPREDNISolone, 12 mg, oral, Once   Followed by  [START ON 1/1/2024] methylPREDNISolone, 8 mg, oral, Once   Followed by  [START ON 1/2/2024] methylPREDNISolone, 4 mg, oral, Once  metoprolol succinate XL, 25 mg, oral, Daily  multivitamin, 1 tablet, oral, Daily  pantoprazole, 40 mg, oral, Daily before breakfast  sucralfate, 1 g, oral, BID AC  traZODone, 50 mg, oral, Nightly      Continuous medications     PRN medications  PRN medications: acetaminophen **OR** acetaminophen **OR** acetaminophen, benzocaine-menthol, cyclobenzaprine, dextromethorphan-guaifenesin, diclofenac sodium, guaiFENesin, morphine, nitroglycerin, ondansetron, polyethylene glycol, prochlorperazine    ASSESSMENT:  Falls  Lower back, left hip, lower extremity pain, numbness - improved  Weakness improved  Abnormal MRI lumbar spine  Small central disc herniation, minute tear  Mild spinal canal stenosis  Obesity  Hyperglycemia    PLAN:  Patient is doing well this morning.  Symptoms improved.  Continue analgesics.  P.r.n muscle relaxants.  Medrol dose malissa.  Outpatient pain management.  PT/OT.  Fall precautions.  Up with assistance.  DVT prophylaxis Heparin subcutaneous.  Diet, weight loss, exercise as tolerated.  9/24/23 HgbA1c 5.4.  Repeat.  Monitor.  Outpatient sleep study.  Discussed with patient.  Supportive care.  Patient reassured.  Case management following for discharge planning.  Discussed with   Yuridia.  Okay to discharge home today.       Discussed with nursing    ALIREZA Pfieffer-CNP

## 2023-12-29 NOTE — DISCHARGE SUMMARY
Discharge Diagnosis  Falls  Back pain associated with peripheral numbness  Weakness improved  Abnormal MRI lumbar spine  Small central disc herniation, minute tear  Mild spinal canal stenosis  Obesity  Hyperglycemia    Issues Requiring Follow-Up  Back pain, numbness    Discharge Meds     Your medication list        START taking these medications        Instructions Last Dose Given Next Dose Due   HYDROcodone-acetaminophen 5-325 mg tablet  Commonly known as: Norco      Take 1 tablet by mouth every 6 hours if needed for moderate pain (4 - 6).       methylPREDNISolone 4 mg tablet  Commonly known as: Medrol      Take 5 tablets (20 mg) by mouth 1 time for 1 dose.       methylPREDNISolone 4 mg tablets  Commonly known as: Medrol Dospak      Take as directed on package.       methylPREDNISolone 16 mg tablet  Commonly known as: Medrol  Start taking on: December 30, 2023      Take 1 tablet (16 mg total) by mouth 1 time for 1 dose.       methylPREDNISolone 4 mg tablet  Commonly known as: Medrol  Start taking on: December 31, 2023      Take 3 tablets (12 mg) by mouth 1 time for 1 dose. Do not start before December 31, 2023.       methylPREDNISolone 8 mg tablet  Commonly known as: Medrol  Start taking on: January 1, 2024      Take 1 tablet (8 mg) by mouth 1 time for 1 dose. Do not start before January 1, 2024.              CONTINUE taking these medications        Instructions Last Dose Given Next Dose Due   ascorbic acid 500 mg tablet  Commonly known as: Vitamin C           cholecalciferol 50 mcg (2,000 unit) capsule  Commonly known as: Vitamin D-3           cyclobenzaprine 10 mg tablet  Commonly known as: Flexeril           dicyclomine 20 mg tablet  Commonly known as: Bentyl           famotidine 40 mg tablet  Commonly known as: Pepcid           fluticasone 50 mcg/actuation nasal spray  Commonly known as: Flonase           Lialda 1.2 gram EC tablet  Generic drug: mesalamine           multivitamin tablet           pantoprazole 40  mg EC tablet  Commonly known as: ProtoNix           sucralfate 1 gram tablet  Commonly known as: Carafate           traZODone 50 mg tablet  Commonly known as: Desyrel                  STOP taking these medications      azithromycin 500 mg tablet  Commonly known as: Zithromax        dexAMETHasone 6 mg tablet  Commonly known as: Decadron        esomeprazole 40 mg DR capsule  Commonly known as: NexIUM        nitroglycerin 0.4 mg SL tablet  Commonly known as: Nitrostat        ondansetron 4 mg tablet  Commonly known as: Zofran        ondansetron ODT 8 mg disintegrating tablet  Commonly known as: Zofran-ODT        Paxlovid 300 mg (150 mg x 2)-100 mg tablet therapy pack  Generic drug: nirmatrelvir-ritonavir                  Where to Get Your Medications        These medications were sent to [id number] - [place], [place] - [address]  [address] [place] [address]      Phone: [phone]   HYDROcodone-acetaminophen 5-325 mg tablet  methylPREDNISolone 16 mg tablet  methylPREDNISolone 4 mg tablets  methylPREDNISolone 4 mg tablet  methylPREDNISolone 4 mg tablet  methylPREDNISolone 8 mg tablet         Test Results Pending At Discharge  Pending Labs       Order Current Status    Hemoglobin A1c In process          Hospital Course  This is a very pleasant 27-year-old obese  male who presented to the emergency department with complaints of falls, lower back pain and inability to ambulate.  In the emergency department, initial workup was done.  He was treated in the emergency department with analgesics.  He was admitted.  He underwent of the lumbar spine which per radiology showed a small central disc herniation and minute tear of the annulus process posteriorly involving the L4-L5 disc, with mild spinal canal stenosis.  He was treated with analgesics and therapy.  His symptoms improved.  He is advised outpatient pain management.  He is stable for discharge home.    Pertinent Physical Exam At Time of  Discharge  See physical examination    Outpatient Follow-Up  Follow up with Dr. Shi in 1 week    Schedule appointment with pain management      ALIREZA Pfeiffer-CNP

## 2024-01-03 DIAGNOSIS — M54.42 ACUTE MIDLINE LOW BACK PAIN WITH LEFT-SIDED SCIATICA: Primary | ICD-10-CM

## 2024-01-12 ENCOUNTER — EVALUATION (OUTPATIENT)
Dept: PHYSICAL THERAPY | Facility: CLINIC | Age: 29
End: 2024-01-12
Payer: COMMERCIAL

## 2024-01-12 DIAGNOSIS — M54.42 ACUTE MIDLINE LOW BACK PAIN WITH LEFT-SIDED SCIATICA: ICD-10-CM

## 2024-01-12 PROCEDURE — 97161 PT EVAL LOW COMPLEX 20 MIN: CPT | Mod: GP

## 2024-01-12 ASSESSMENT — PAIN SCALES - GENERAL: PAINLEVEL_OUTOF10: 6

## 2024-01-12 ASSESSMENT — PAIN - FUNCTIONAL ASSESSMENT: PAIN_FUNCTIONAL_ASSESSMENT: 0-10

## 2024-01-12 NOTE — PROGRESS NOTES
Physical Therapy Evaluation and Treatment      Patient Name: Yvon Parks  MRN: [id number]  Today's Date: 1/12/2024  Time Calculation  Start Time: 0815  Stop Time: 0900  Time Calculation (min): 45 min  PT Evaluation Time Entry  PT Evaluation (Low) Time Entry: 45          Visit Number:  1 (including evaluation)  Planned total visits: 15  Visit Authorized:  30/yr  Insurance verification info: $25.00 COPAY PER VISIT/ 100% COVERAGE/ 30 VISITS/ NO AUTH/ PER RTE    Current Problem:   1. Acute midline low back pain with left-sided sciatica  Referral to Physical Therapy    Follow Up In Physical Therapy        Relevant Imaging:  MR lumbar spine wo IV contrast  Result Date: 12/28/2023   FINDINGS: Alignment: Gross alignment of the lumbar spine is normal in the sagittal plane.   Bone marrow signal: Signal from vertebral bodies and posterior elements is within normal limits.   Conus: The conus medullaris terminates normally at the T12-L1 level.   Paraspinal soft tissues: Normal.   Lower thoracic spine: No cord impingement.   L1-L2: Unremarkable.   L2-L3: Hypertrophic change of the facet joints. No significant spinal canal or foraminal stenosis.   L3-L4: Hypertrophic change of the facet joints without significant spinal canal or foraminal stenosis.   L4-L5: Small central disc herniation with minute tear of the annulus fibrosis posteriorly. Hypertrophic change of the facet joints. Mild spinal stenosis without significant foraminal stenosis or nerve root impingement.   L5-S1: Disc space narrowing. Mild hypertrophic change of the facet joints. No significant spinal canal or foraminal stenosis.     Small central disc herniation and minute tear of the annulus fibrosis posteriorly involving the L4-L5 disc, with mild spinal canal stenosis and no significant foraminal stenosis. Additional mild degenerative changes as above. No nerve root impingement.   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 He notes that he was leaving and walking down a hill and slipped, landing on his L side. He notes that he tried to use NSAIDs, heat, and ice and nothing help. He then started having the LE give out and cause more falls. On 12/23, he went to the ER because he fell 3 times in 1 day and the pain was worsening. He was admitted for 3 days and was on steroids, which slightly helped the LE numbness, but not the pain. He was then referred to PT.     Hx of 2 surgeries in each knee - knees hurt constantly. He also notes hip impingement issues and gets cortisone injections intermittently, but pain is not constant.     Aggravating factors: Lifting >5#, prolonged walking or standing, laying flat  Relieving factors: vibrating heating pad, massage (temporary relief)     Precautions:     Red flags   Hx of CA No   Pacemaker/ Electronic Implant No   Saddle Anesthesia No   Bowel/Bladder Changes No   Sudden Weakness Yes - leg giving out d/t pain and wkns   Recent Falls (within last 6mo) Yes - see subjective for details     Pain:  Pain Assessment  Pain Assessment: 0-10  Pain Score: 6  Pain Type: Neuropathic pain  Pain Location: Back  Pain Orientation: Lower, Mid, Left  Pain Radiating Towards: L glute to posterior and lateral thigh, eventually migrates into anterior thigh and stops just below the knee  Pain Descriptors: Sharp, Stabbing, Aching, Burning, Pressure  Pain Frequency: Constant/continuous (radiating symptoms are intermittent)  Home Living:    Lives with: Family  Home type: House  Stairs: Yes - ranch with basement - has handrail   Prior Level of Function:  Prior Function Per Pt/Caregiver Report  Vocational: Full time employment (nurse)    Objective   Posture:  Posture Comment: Pt  "seated with trunk lean toward the R side and with forward head, rounded shoulders, increased thoracic kyphosis, and flattened lumbar lordosis.  Flexibility:  Flexibility Comment: bilat hamstrings lacking ~10deg  Palpation:  Palpation Comment: Grade II tenderness through the R thoracic and lumbar paraspinals and through R glutes; Grade II-III tenderness through the L thoracic and lumbar paraspinals and L glutes. Unable to truly assess joint mobility d/t myofascial pain.  Gait:  Gait Comment: Pt ambulates into the clinic indep without AD. Pt ambulates with antalgic gait pattern on the L LE.  Other:  Comment: Repeated flexion in stand x10 = no change in pain, no radiating symptoms; Repeated extension in stand x10 = no change in pain, \"twinge\" into L glute that did not linger    Lumbar AROM Range   Flexion 50% limitation *low back   Extension 75% ;limitation * low back   R sidebend 25% limitation \"pulling in L sided low back\"   L sidebend 25% limitation       Hip MMT L R   Hip Flexion 4/5 5/5   External Rotation 4+/5 5/5   Internal Rotation 4+/5 5/5      Knee MMT L R   Knee Flexion 4-/5 5/5   Knee Extension 4+/5 5/5      Ankle MMT L R   Dorsiflexion 4/5 5/5   Plantarflexion 4+/5 5/5      Special Tests: +/-   Slump + on L   SLR  -      Outcome Measures:  Other Measures  Oswestry Disablity Index (MELO): 37/50     OP EDUCATION:  Outpatient Education  Individual(s) Educated: Patient  Education Provided: Anatomy, Body Mechanics, Home Exercise Program, POC  Risk and Benefits Discussed with Patient/Caregiver/Other: yes  Patient/Caregiver Demonstrated Understanding: yes  Plan of Care Discussed and Agreed Upon: yes  Education Comment: [facility] [employer] Access Code: [email or web address]    HEP to be completed daily; exercises listed in pt instructions     Assessment:  PT Assessment Results: Decreased strength, Decreased range of motion, Pain  Rehab Prognosis: Good    Pt is a 28 y.o. Male who presents with signs and symptoms consistent with lumbar " "radiculopathy. The current impairments have led to functional limitations that include: standing, walking, and lifting. Pt would benefit from skilled physical therapy intervention to improve impairments and facilitate return to prior function.    Plan:  Treatment/Interventions: Aquatic therapy, Cryotherapy, Dry needling, Education/ Instruction, Gait training, Electrical stimulation, Hot pack, Manual therapy, Mechanical traction, Neuromuscular re-education, Taping techniques, Therapeutic activities, Therapeutic exercises, Ultrasound  PT Plan: Skilled PT  PT Frequency:  (1-2x/wk)  Duration: 15visits  Onset Date: 01/12/24  Certification Period Start Date: 01/12/24  Certification Period End Date: 04/11/24  Number of Treatments Authorized: 30/yr  Rehab Potential: Good  Plan of Care Agreement: Patient  Starting with 2x/wk for 3 weeks, then reduce to 1x/wk. Initial emphasis on core stabilization and manual for reduction of myofascial pain. Monitor for possible extension program pending further assessment for directional preference.     Goals:  Active       PT Problem       PT STG       Start:  01/12/24    Expected End:  02/26/24       - Pt will complete the HEP with <3 verbal cues for correction  - Pt will demonstrate 2pt improvement on the NPRS, allowing for improved tolerance of functional activities.,   - Pt will demonstrate min losses in all lumbar AROM, without onset of pain, allowing for improved ability to perform functional activities.,   - Pt will demonstrate ability to perform 5 step ups to 8\" step leading with each LE and with 1 UE support in order to demonstrate improved ability to navigate stairs,   - Pt will demonstrate ability to ambulate 300' with least restrictive device and equal stance time bilat in order to begin demoing normalized gait pattern.,   - Pt will demonstrate at least 4/5 MMT grading throughout LE musculature, allowing for appropriate muscle recruitment during daily activities.          PT " "LTG       Start:  01/12/24    Expected End:  04/11/24       - Pt will be independent in HEP & symptom management,   - Pt will demonstrate 4pt improvement on the NPRS, allowing for improved tolerance of functional activities. ,   - Pt will demonstrate full ROM in all lumbar AROM, without onset of pain, allowing for the ability to perform functional activities.,   - Pt will demonstrate ability to ambulate at least 350' with least restrictive/no device without breaks or increases in pain in order to demonstrate improved tolerance to prolonged ambulation.,   - Pt will obtain and maintain a neutral posture throughout PT session in order to allow for proper muscle length-tension relationships and recruitment for daily tasks,   - Pt will demonstrate ability to perform 10 sit to stands without UE use in order to demonstrate improved functional LE strength and improved independence with functional mobility,   - Pt will demonstrate ability to perform 10 step ups to 8\" step leading with each LE and with 1 UE support in order to demonstrate improved ability to navigate stairs,   - Pt will demonstrate improved OSWESTRY score by 13 points (MCID) in order to demonstrate improved functional mobility.              ,                "

## 2024-01-15 ENCOUNTER — TREATMENT (OUTPATIENT)
Dept: PHYSICAL THERAPY | Facility: CLINIC | Age: 29
End: 2024-01-15
Payer: COMMERCIAL

## 2024-01-15 DIAGNOSIS — M54.42 ACUTE MIDLINE LOW BACK PAIN WITH LEFT-SIDED SCIATICA: ICD-10-CM

## 2024-01-15 PROCEDURE — 97140 MANUAL THERAPY 1/> REGIONS: CPT | Mod: GP,CQ

## 2024-01-15 PROCEDURE — 97110 THERAPEUTIC EXERCISES: CPT | Mod: GP,CQ

## 2024-01-15 ASSESSMENT — PAIN SCALES - GENERAL: PAINLEVEL_OUTOF10: 6

## 2024-01-15 ASSESSMENT — PAIN - FUNCTIONAL ASSESSMENT: PAIN_FUNCTIONAL_ASSESSMENT: 0-10

## 2024-01-15 NOTE — PROGRESS NOTES
"Physical Therapy Treatment    Patient Name: Yvon Parks  MRN: [id number]  Encounter date:  1/15/2024  Time Calculation  Start Time: 0846  Stop Time: 0930  Time Calculation (min): 44 min     PT Therapeutic Procedures Time Entry  Manual Therapy Time Entry: 25  Therapeutic Exercise Time Entry: 15    Visit Number:  2 (including evaluation)  Planned total visits: 15  Visit Authorized:  9   1/12/2024-1/11/2025    Visit Number:  2 / 10 (eval +9)   Visit Authorized:  9    Current Problem  1. Acute midline low back pain with left-sided sciatica  Follow Up In Physical Therapy        Precautions   None      Pain  Pain Assessment: 0-10  Pain Score: 6    Subjective  General  General Comment: Patient reports that his pain remains the same and appears to be affected by the cold weather as well.  He reports performing standing back extension each morning to feel stiffness which determines what his day will be, for example, what his tolerance to movement or standing will be.   He notes significant tightness in his back and L LE to knee.         Objective  Tightness and tenderness throughout L LE, piriformis, across low back.    Treatment:  Therapeutic Exercise  Therapeutic Exercise Performed: Yes  H/L Abdominal bracing 5\" x 10  Seated Hamstring stretch 15\" x3 R/L  Seated Gastrocs stretch 15\" x3 R/L  Supine Piriformis stretch 15\" x3 L    Reviewed LTR x2    Billed Treatment Times:  Therapeutic Exercise 15 min      Manual:    MFR/DTM low back, L hamstrings, L ITB, Piriformis,; L Piriformis TR, L Piriformis stretches  Billed Treatment Times:  Manual Therapy  25 min      OP EDUCATION:  Outpatient Education  Individual(s) Educated:  (Patient given [email or web address] exercise instrucctions.)  Education Provided:  (Patient given [employer] instruction sheets)    Assessment:  PT Assessment  Assessment Comment: Pt's response to treatment:  Good  Areas of improvements:  Decreased pain from constant 6/10 to 4/10 with decreased tightness in low back and " "LLE.   Limitations/deficits:  Continues with significant tightness throughout low back and BLE. Greater tightness on L side including piriformis, hamstrings, gastrocs and ITBands.    Pain end of session:  4/10    Plan:     Continue with current POC with the following changes : Focus on manual tx with the addition of new therapeutic  exercises at appropriate.     Assessment of current progress against goals:  Symptomatic relief with treatment    Goals:  Active       PT Problem       PT STG       Start:  01/12/24    Expected End:  02/26/24       - Pt will complete the HEP with <3 verbal cues for correction  - Pt will demonstrate 2pt improvement on the NPRS, allowing for improved tolerance of functional activities.,   - Pt will demonstrate min losses in all lumbar AROM, without onset of pain, allowing for improved ability to perform functional activities.,   - Pt will demonstrate ability to perform 5 step ups to 8\" step leading with each LE and with 1 UE support in order to demonstrate improved ability to navigate stairs,   - Pt will demonstrate ability to ambulate 300' with least restrictive device and equal stance time bilat in order to begin demoing normalized gait pattern.,   - Pt will demonstrate at least 4/5 MMT grading throughout LE musculature, allowing for appropriate muscle recruitment during daily activities.          PT LTG       Start:  01/12/24    Expected End:  04/11/24       - Pt will be independent in HEP & symptom management,   - Pt will demonstrate 4pt improvement on the NPRS, allowing for improved tolerance of functional activities. ,   - Pt will demonstrate full ROM in all lumbar AROM, without onset of pain, allowing for the ability to perform functional activities.,   - Pt will demonstrate ability to ambulate at least 350' with least restrictive/no device without breaks or increases in pain in order to demonstrate improved tolerance to prolonged ambulation.,   - Pt will obtain and maintain a " "neutral posture throughout PT session in order to allow for proper muscle length-tension relationships and recruitment for daily tasks,   - Pt will demonstrate ability to perform 10 sit to stands without UE use in order to demonstrate improved functional LE strength and improved independence with functional mobility,   - Pt will demonstrate ability to perform 10 step ups to 8\" step leading with each LE and with 1 UE support in order to demonstrate improved ability to navigate stairs,   - Pt will demonstrate improved OSWESTRY score by 13 points (MCID) in order to demonstrate improved functional mobility.                        "

## 2024-01-19 ENCOUNTER — TREATMENT (OUTPATIENT)
Dept: PHYSICAL THERAPY | Facility: CLINIC | Age: 29
End: 2024-01-19
Payer: COMMERCIAL

## 2024-01-19 DIAGNOSIS — M54.42 ACUTE MIDLINE LOW BACK PAIN WITH LEFT-SIDED SCIATICA: ICD-10-CM

## 2024-01-19 PROCEDURE — 97140 MANUAL THERAPY 1/> REGIONS: CPT | Mod: GP

## 2024-01-19 PROCEDURE — 97110 THERAPEUTIC EXERCISES: CPT | Mod: GP

## 2024-01-19 ASSESSMENT — PAIN SCALES - GENERAL: PAINLEVEL_OUTOF10: 6

## 2024-01-19 NOTE — PROGRESS NOTES
"Physical Therapy Treatment    Patient Name: Yvon Parks  MRN: [id number]  Encounter date:  1/19/2024  Time Calculation  Start Time: 0945  Stop Time: 1025  Time Calculation (min): 40 min     PT Therapeutic Procedures Time Entry  Manual Therapy Time Entry: 10  Therapeutic Exercise Time Entry: 30    Visit Number:  3 (including evaluation)  Planned total visits: 15  Visit Authorized:  9   1/12/2024-1/11/2025    Current Problem  1. Acute midline low back pain with left-sided sciatica  Follow Up In Physical Therapy        Precautions   None    Pain  Pain Score: 6  Pain Location: Back    Subjective  General    The pt returns to the clinic stating that he is really sore today. He has been noticing a lot of pulling in the L glute.      Objective  Tightness and tenderness throughout L LE, piriformis, across low back.  Centralization with lumbar extension    Treatment:   Ther-ex:  - Nustep L2 x6mins  - Supine Piriformis stretch 30\" x2 bilat  - Supine hamstring stretch with strap 30\" x2 bilat  Pain at 5/10 - only in low back/TLJ - no radicular symptoms  - Prone prop x1min - reduced pain to 4/10 -- repeated prop for another 1min - pain = 4/10  - Prone press ups x10 - 4-5/10 \"ache\" has moved up slightly, repeated a second set of 10 - pain = 4/10 \"feels less tight\"     Manual:    - Prone MFR/STM through lumbar paraspinals, QL, L hamstrings, L ITB, L Piriformis     OP EDUCATION:  Outpatient Education  Education Provided:  (Patient given [employer] instruction sheets)    Assessment:   The pt notes a reduction of symptoms at the end of session. The pt appeared to respond favorable to an extension based program with a slight reduction in symptoms. Manual intervention at end of session assist in reducing symptoms further.     Pain end of session:  4-5/10 \"more muscle soreness than pain\"    Plan:  OP PT Plan  Treatment/Interventions: Aquatic therapy, Cryotherapy, Dry needling, Education/ Instruction, Gait training, Electrical stimulation, " "Hot pack, Manual therapy, Mechanical traction, Neuromuscular re-education, Taping techniques, Therapeutic activities, Therapeutic exercises, Ultrasound  PT Plan: Skilled PT  PT Frequency:  (1-2x/wk)  Duration: 15visits  Onset Date: 01/12/24  Certification Period Start Date: 01/12/24  Certification Period End Date: 04/11/24  Number of Treatments Authorized: 30/yr  Rehab Potential: Good  Plan of Care Agreement: Patient  Continue with current POC/no changes  -- focus on extension based program, core stabilization, and introduce lifting mechanics as able --    Assessment of current progress against goals:  Symptomatic relief with treatment    Goals:  Active       PT Problem       PT STG       Start:  01/12/24    Expected End:  02/26/24       - Pt will complete the HEP with <3 verbal cues for correction  - Pt will demonstrate 2pt improvement on the NPRS, allowing for improved tolerance of functional activities.,   - Pt will demonstrate min losses in all lumbar AROM, without onset of pain, allowing for improved ability to perform functional activities.,   - Pt will demonstrate ability to perform 5 step ups to 8\" step leading with each LE and with 1 UE support in order to demonstrate improved ability to navigate stairs,   - Pt will demonstrate ability to ambulate 300' with least restrictive device and equal stance time bilat in order to begin demoing normalized gait pattern.,   - Pt will demonstrate at least 4/5 MMT grading throughout LE musculature, allowing for appropriate muscle recruitment during daily activities.          PT LTG       Start:  01/12/24    Expected End:  04/11/24       - Pt will be independent in HEP & symptom management,   - Pt will demonstrate 4pt improvement on the NPRS, allowing for improved tolerance of functional activities. ,   - Pt will demonstrate full ROM in all lumbar AROM, without onset of pain, allowing for the ability to perform functional activities.,   - Pt will demonstrate ability to " "ambulate at least 350' with least restrictive/no device without breaks or increases in pain in order to demonstrate improved tolerance to prolonged ambulation.,   - Pt will obtain and maintain a neutral posture throughout PT session in order to allow for proper muscle length-tension relationships and recruitment for daily tasks,   - Pt will demonstrate ability to perform 10 sit to stands without UE use in order to demonstrate improved functional LE strength and improved independence with functional mobility,   - Pt will demonstrate ability to perform 10 step ups to 8\" step leading with each LE and with 1 UE support in order to demonstrate improved ability to navigate stairs,   - Pt will demonstrate improved OSWESTRY score by 13 points (MCID) in order to demonstrate improved functional mobility.                      "

## 2024-01-22 ENCOUNTER — TREATMENT (OUTPATIENT)
Dept: PHYSICAL THERAPY | Facility: CLINIC | Age: 29
End: 2024-01-22
Payer: COMMERCIAL

## 2024-01-22 DIAGNOSIS — M54.42 ACUTE MIDLINE LOW BACK PAIN WITH LEFT-SIDED SCIATICA: ICD-10-CM

## 2024-01-22 PROCEDURE — 97140 MANUAL THERAPY 1/> REGIONS: CPT | Mod: GP,CQ

## 2024-01-22 PROCEDURE — 97110 THERAPEUTIC EXERCISES: CPT | Mod: GP,CQ

## 2024-01-22 ASSESSMENT — PAIN SCALES - GENERAL: PAINLEVEL_OUTOF10: 7

## 2024-01-22 ASSESSMENT — PAIN - FUNCTIONAL ASSESSMENT: PAIN_FUNCTIONAL_ASSESSMENT: 0-10

## 2024-01-22 NOTE — PROGRESS NOTES
"Physical Therapy Treatment    Patient Name: Yvon Parks  MRN: [id number]  Encounter date:  1/22/2024  Time Calculation  Start Time: 0845     PT Therapeutic Procedures Time Entry  Manual Therapy Time Entry: 20  Therapeutic Exercise Time Entry: 23    Visit Number:  4 (including evaluation)  Planned total visits: 15  Visit Authorized:  9 1/12/24 - 1/11/25        Current Problem  1. Acute midline low back pain with left-sided sciatica  Follow Up In Physical Therapy          Precautions  Precautions  Precautions Comment: none      Pain  Pain Assessment: 0-10  Pain Score: 7  Pain Location: Back    Subjective  General  General Comment: Patient reports that he slipped on ice and his pain level has increased on his L side. He reports that manual tx gives him relief for a few days afterward.         Objective  Low back tightness L>R side into QL, hamstrings, ITB, Piriformis.    Treatment:     Therapeutic exercise   Ther-ex:  - Nustep L2 x 8mins  - Supine Piriformis stretch 30\" x2 bilat  - Seated hamstring stretch 30\" x  -MoFlex 3 x 30\"  -Seated gastroc stretch with strap for demo 15\" x1  -Seated Ab bracing 5\" x 8       Billed Treatment Times:  Therapeutic Exercise 23 min         Manual:    Manual:    - Prone MFR/STM through lumbar paraspinals, QL, L hamstrings, L ITB, L Piriformis    Billed Treatment Times:  Manual Therapy  20 min      OP EDUCATION:   Added gastroc and hamstring stretches to HEP.    Assessment:  PT Assessment  Assessment Comment: OP PT Plan  Plan of Care Agreement:  (Patient tolerated the progression of MoFlex and seated hamstring stretch without increased sx as well as seated Ab bracing.  His pain level decreased considerably for 7/10 to 3/10 at session end.  Pt's response to treatment:  good  Areas of improvements:  Tolerance to exercise after manual  Limitations/deficits:  Flexibility and strength    Pain end of session:  3/10    Plan:  Resume prone prop/press ups at next visit.)     Assessment of current " "progress against goals:  Symptomatic relief with treatment    Goals:  Active       PT Problem       PT STG       Start:  01/12/24    Expected End:  02/26/24       - Pt will complete the HEP with <3 verbal cues for correction  - Pt will demonstrate 2pt improvement on the NPRS, allowing for improved tolerance of functional activities.,   - Pt will demonstrate min losses in all lumbar AROM, without onset of pain, allowing for improved ability to perform functional activities.,   - Pt will demonstrate ability to perform 5 step ups to 8\" step leading with each LE and with 1 UE support in order to demonstrate improved ability to navigate stairs,   - Pt will demonstrate ability to ambulate 300' with least restrictive device and equal stance time bilat in order to begin demoing normalized gait pattern.,   - Pt will demonstrate at least 4/5 MMT grading throughout LE musculature, allowing for appropriate muscle recruitment during daily activities.          PT LTG       Start:  01/12/24    Expected End:  04/11/24       - Pt will be independent in HEP & symptom management,   - Pt will demonstrate 4pt improvement on the NPRS, allowing for improved tolerance of functional activities. ,   - Pt will demonstrate full ROM in all lumbar AROM, without onset of pain, allowing for the ability to perform functional activities.,   - Pt will demonstrate ability to ambulate at least 350' with least restrictive/no device without breaks or increases in pain in order to demonstrate improved tolerance to prolonged ambulation.,   - Pt will obtain and maintain a neutral posture throughout PT session in order to allow for proper muscle length-tension relationships and recruitment for daily tasks,   - Pt will demonstrate ability to perform 10 sit to stands without UE use in order to demonstrate improved functional LE strength and improved independence with functional mobility,   - Pt will demonstrate ability to perform 10 step ups to 8\" step leading " with each LE and with 1 UE support in order to demonstrate improved ability to navigate stairs,   - Pt will demonstrate improved OSWESTRY score by 13 points (MCID) in order to demonstrate improved functional mobility.

## 2024-01-26 ENCOUNTER — TREATMENT (OUTPATIENT)
Dept: PHYSICAL THERAPY | Facility: CLINIC | Age: 29
End: 2024-01-26
Payer: COMMERCIAL

## 2024-01-26 DIAGNOSIS — M54.42 ACUTE MIDLINE LOW BACK PAIN WITH LEFT-SIDED SCIATICA: ICD-10-CM

## 2024-01-26 PROCEDURE — 97110 THERAPEUTIC EXERCISES: CPT | Mod: GP

## 2024-01-26 ASSESSMENT — PAIN SCALES - GENERAL: PAINLEVEL_OUTOF10: 5 - MODERATE PAIN

## 2024-01-26 NOTE — PROGRESS NOTES
"Physical Therapy Treatment    Patient Name: Yvon Parks  MRN: [id number]  Encounter date:  1/26/2024  Time Calculation  Start Time: 0820  Stop Time: 0855  Time Calculation (min): 35 min     PT Therapeutic Procedures Time Entry  Therapeutic Exercise Time Entry: 35    Visit Number:  5 (including evaluation)  Planned total visits: 15  Visit Authorized:  9 1/12/24 - 1/11/25        Current Problem  1. Acute midline low back pain with left-sided sciatica  Follow Up In Physical Therapy          Precautions  Precautions  Precautions Comment: none    Pain  Pain Score: 5 - Moderate pain  Pain Location: Back    Subjective  General    The pt notes that prone exercises continue to keep symptoms in his back and out of his glute/LE. 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      Objective  Low back tightness L>R side into QL, hamstrings, ITB, Piriformis.    Treatment:   Ther-ex:  - Nustep L2 x 7mins  - Standing lumbar extensions x20  - Seated TrA bracing 5\" x 10  - Seated postural correction   - Seated TrA bracing + OH press with 10lb med ball x10  - TrA bracing + chest press with 6lb med ball x10  - TrA bracing + squat x10 - max cues for form   - TrA bracing + squat with 10lb med ball x5  - TrA bracing + modified split squat with 10lb med ball x5 leading with ea LE         OP EDUCATION:   Added TrA bracing + functional mobility to HEP; Max education on posture and lifting techniques.     Assessment:    Initiated core stabilization and emphasized form and posture during movement in order to ensure pt understands safe techniques for movement that he can utilize at work during strenuous tasks.   Pt's response to treatment:  good  Areas of improvements: TrA bracing  Limitations/deficits:  posture, core weakness     Pain end of session:  5/10 - no pain in the legs - all in the back.     Plan:  OP PT " "Plan  Treatment/Interventions: Aquatic therapy, Cryotherapy, Dry needling, Education/ Instruction, Gait training, Electrical stimulation, Hot pack, Manual therapy, Mechanical traction, Neuromuscular re-education, Taping techniques, Therapeutic activities, Therapeutic exercises, Ultrasound  PT Plan: Skilled PT  PT Frequency:  (1-2x/wk)  Duration: 15visits  Onset Date: 01/12/24  Certification Period Start Date: 01/12/24  Certification Period End Date: 04/11/24  Number of Treatments Authorized: 30/yr  Rehab Potential: Good  Plan of Care Agreement: Patient  Continue with current POC/no changes    Assessment of current progress against goals:  Symptomatic relief with treatment    Goals:  Active       PT Problem       PT STG       Start:  01/12/24    Expected End:  02/26/24       - Pt will complete the HEP with <3 verbal cues for correction  - Pt will demonstrate 2pt improvement on the NPRS, allowing for improved tolerance of functional activities.,   - Pt will demonstrate min losses in all lumbar AROM, without onset of pain, allowing for improved ability to perform functional activities.,   - Pt will demonstrate ability to perform 5 step ups to 8\" step leading with each LE and with 1 UE support in order to demonstrate improved ability to navigate stairs,   - Pt will demonstrate ability to ambulate 300' with least restrictive device and equal stance time bilat in order to begin demoing normalized gait pattern.,   - Pt will demonstrate at least 4/5 MMT grading throughout LE musculature, allowing for appropriate muscle recruitment during daily activities.          PT LTG       Start:  01/12/24    Expected End:  04/11/24       - Pt will be independent in HEP & symptom management,   - Pt will demonstrate 4pt improvement on the NPRS, allowing for improved tolerance of functional activities. ,   - Pt will demonstrate full ROM in all lumbar AROM, without onset of pain, allowing for the ability to perform functional activities., " "  - Pt will demonstrate ability to ambulate at least 350' with least restrictive/no device without breaks or increases in pain in order to demonstrate improved tolerance to prolonged ambulation.,   - Pt will obtain and maintain a neutral posture throughout PT session in order to allow for proper muscle length-tension relationships and recruitment for daily tasks,   - Pt will demonstrate ability to perform 10 sit to stands without UE use in order to demonstrate improved functional LE strength and improved independence with functional mobility,   - Pt will demonstrate ability to perform 10 step ups to 8\" step leading with each LE and with 1 UE support in order to demonstrate improved ability to navigate stairs,   - Pt will demonstrate improved OSWESTRY score by 13 points (MCID) in order to demonstrate improved functional mobility.                      "

## 2024-02-01 ENCOUNTER — APPOINTMENT (OUTPATIENT)
Dept: PHYSICAL THERAPY | Facility: CLINIC | Age: 29
End: 2024-02-01
Payer: COMMERCIAL

## 2024-02-02 ENCOUNTER — APPOINTMENT (OUTPATIENT)
Dept: PHYSICAL THERAPY | Facility: CLINIC | Age: 29
End: 2024-02-02
Payer: COMMERCIAL

## 2024-02-05 ENCOUNTER — APPOINTMENT (OUTPATIENT)
Dept: PHYSICAL THERAPY | Facility: CLINIC | Age: 29
End: 2024-02-05
Payer: COMMERCIAL

## 2024-02-09 ENCOUNTER — APPOINTMENT (OUTPATIENT)
Dept: PHYSICAL THERAPY | Facility: CLINIC | Age: 29
End: 2024-02-09
Payer: COMMERCIAL

## 2024-02-12 ENCOUNTER — APPOINTMENT (OUTPATIENT)
Dept: PHYSICAL THERAPY | Facility: CLINIC | Age: 29
End: 2024-02-12
Payer: COMMERCIAL

## 2024-03-22 ENCOUNTER — DOCUMENTATION (OUTPATIENT)
Dept: PHYSICAL THERAPY | Facility: CLINIC | Age: 29
End: 2024-03-22
Payer: COMMERCIAL

## 2024-03-22 NOTE — PROGRESS NOTES
Physical Therapy    Discharge Summary    Name: Yvon Parks  MRN: [id number]  : 1995  Date: 24    Discharge Summary: PT    Discharge Information: Date of discharge 3/22/24, Date of last visit 24, Date of evaluation 24, Number of attended visits 5, Referred by Guille Shi, and Referred for Acute midline low back pain with L sided sciatica    Therapy Summary: At the pts most recent session, symptoms were centralized and posture/ lifting techniques were reviewed as pt had reported an upcoming return to work date. Following that session, the pt no showed his next scheduled session and then cancelled all remaining sessions, so current status is unknown.     Rehab Discharge Reason: Failed to schedule and/or keep follow-up appointment(s)

## 2024-04-18 ENCOUNTER — APPOINTMENT (OUTPATIENT)
Dept: RADIOLOGY | Facility: HOSPITAL | Age: 29
End: 2024-04-18
Payer: COMMERCIAL

## 2024-04-18 ENCOUNTER — HOSPITAL ENCOUNTER (EMERGENCY)
Facility: HOSPITAL | Age: 29
Discharge: HOME | End: 2024-04-18
Attending: EMERGENCY MEDICINE
Payer: COMMERCIAL

## 2024-04-18 VITALS
RESPIRATION RATE: 20 BRPM | HEIGHT: 76 IN | TEMPERATURE: 98.4 F | DIASTOLIC BLOOD PRESSURE: 97 MMHG | WEIGHT: 308 LBS | BODY MASS INDEX: 37.51 KG/M2 | HEART RATE: 115 BPM | SYSTOLIC BLOOD PRESSURE: 135 MMHG | OXYGEN SATURATION: 100 %

## 2024-04-18 DIAGNOSIS — I10 DIASTOLIC HYPERTENSION: ICD-10-CM

## 2024-04-18 DIAGNOSIS — S93.602A FOOT SPRAIN, LEFT, INITIAL ENCOUNTER: Primary | ICD-10-CM

## 2024-04-18 PROCEDURE — 73630 X-RAY EXAM OF FOOT: CPT | Mod: LT

## 2024-04-18 PROCEDURE — 96372 THER/PROPH/DIAG INJ SC/IM: CPT | Performed by: PHYSICIAN ASSISTANT

## 2024-04-18 PROCEDURE — 73630 X-RAY EXAM OF FOOT: CPT | Mod: LEFT SIDE | Performed by: RADIOLOGY

## 2024-04-18 PROCEDURE — 2500000004 HC RX 250 GENERAL PHARMACY W/ HCPCS (ALT 636 FOR OP/ED): Performed by: PHYSICIAN ASSISTANT

## 2024-04-18 PROCEDURE — 99283 EMERGENCY DEPT VISIT LOW MDM: CPT

## 2024-04-18 RX ORDER — NAPROXEN 375 MG/1
375 TABLET ORAL
Qty: 20 TABLET | Refills: 0 | Status: SHIPPED | OUTPATIENT
Start: 2024-04-18 | End: 2024-04-28

## 2024-04-18 RX ORDER — KETOROLAC TROMETHAMINE 30 MG/ML
30 INJECTION, SOLUTION INTRAMUSCULAR; INTRAVENOUS ONCE
Status: COMPLETED | OUTPATIENT
Start: 2024-04-18 | End: 2024-04-18

## 2024-04-18 RX ORDER — ACETAMINOPHEN 325 MG/1
650 TABLET ORAL ONCE
Status: COMPLETED | OUTPATIENT
Start: 2024-04-18 | End: 2024-04-18

## 2024-04-18 RX ADMIN — ACETAMINOPHEN 650 MG: 325 TABLET ORAL at 18:21

## 2024-04-18 RX ADMIN — KETOROLAC TROMETHAMINE 30 MG: 30 INJECTION, SOLUTION INTRAMUSCULAR at 18:22

## 2024-04-18 ASSESSMENT — COLUMBIA-SUICIDE SEVERITY RATING SCALE - C-SSRS
2. HAVE YOU ACTUALLY HAD ANY THOUGHTS OF KILLING YOURSELF?: NO
1. IN THE PAST MONTH, HAVE YOU WISHED YOU WERE DEAD OR WISHED YOU COULD GO TO SLEEP AND NOT WAKE UP?: NO
6. HAVE YOU EVER DONE ANYTHING, STARTED TO DO ANYTHING, OR PREPARED TO DO ANYTHING TO END YOUR LIFE?: NO

## 2024-04-18 ASSESSMENT — PAIN - FUNCTIONAL ASSESSMENT: PAIN_FUNCTIONAL_ASSESSMENT: 0-10

## 2024-04-18 ASSESSMENT — PAIN DESCRIPTION - LOCATION: LOCATION: FOOT

## 2024-04-18 ASSESSMENT — PAIN SCALES - GENERAL: PAINLEVEL_OUTOF10: 8

## 2024-04-18 ASSESSMENT — PAIN DESCRIPTION - ORIENTATION: ORIENTATION: LEFT

## 2024-04-18 NOTE — Clinical Note
Yvon Parks was seen and treated in our emergency department on 4/18/2024.  He may return to work on 04/22/2024.       If you have any questions or concerns, please don't hesitate to call.      Jaspal Joiner PA-C

## 2024-04-18 NOTE — PROGRESS NOTES
Attestation/Supervisory note for BRANDON Joiner      The patient is a 28-year-old male presenting to the emergency department for evaluation of left foot pain.  He states he jumped out of his truck 4 days ago and injured his left foot.  He does have a history of a previous fracture of his foot.  He states it is painful whenever he is walking and/or standing.  No other injury or trauma.  He did not hit his head or lose consciousness.  No neck or back pain.  No chest pain or shortness of breath.  No abdominal pain.  No nausea vomiting.  No diarrhea or constipation no urinary complaints.  All pertinent positives and negatives are recorded above.  All other systems reviewed and otherwise negative.  Vital signs with hypertension and mild tachycardia but otherwise within normal limits.  Exam with a well-nourished well-developed male in no acute distress.  HEENT exam within normal limits but he has no focal midline neck or back pain with palpation.  No step-offs.  He has no evidence of airway compromise or respiratory distress.  Abdominal exam is benign.  He has no gross motor, neurologic or vascular deficits on exam.  He has no visible or palpable bony deformity.  He does report pain with palpation of the left midfoot.      Oral ibuprofen and oral acetaminophen ordered.      XR foot left 3+ views   Final Result   Remote 4th and 5th metatarsal fractures, healed. No acute findings.        Signed by: Mars Maxwell 4/18/2024 5:56 PM   Dictation workstation:   [id number]           The patient does not have any visible or palpable bony deformities on exam.  He does have evidence of remote fourth and fifth metatarsal fractures on x-ray but no acute fractures or dislocations.      The patient was placed in a postop shoe and given crutches by nursing staff.  The patient was neurovascularly intact after postop shoe application.      The patient was released in good condition with a prescription for naproxen.  He was instructed to  follow-up with his primary care physician within 1 to 2 days for further management of his current symptoms and repeat check of his blood pressure.  He was also given a referral to podiatry for further management of his foot pain.  He will return to the emergency department sooner with worsening of symptoms or onset of new symptoms.      impression/diagnosis  Left foot sprain/contusion  diastolic hypertension            I personally saw the patient and made/approve the management plan and take responsibility for the patient management.      I personally discussed the patient's management with the patient      I reviewed the results of the diagnostic imaging.  Formal radiology read was completed by the radiologist.    Elen Hamlin MD

## 2024-04-18 NOTE — ED PROVIDER NOTES
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  He hurt it about a week ago jumping out of the back of his truck.  He denies any other injury or trauma.  History of prior fracture to the foot.  Has been taping and taking ibuprofen.  He denies numbness or weakness.  Denies any other injury or trauma.  Able to bear weight and ambulate.  No other complaint.                          [place] Coma Scale Score: 15                     Patient History   Past Medical History:   Diagnosis Date    Anxiety 10/30/2023    Arthritis     Chronic GERD 10/06/2023    Elevated blood pressure reading without diagnosis of hypertension 09/27/2015    Lymphedema 10/30/2023    Mixed hyperlipidemia 09/27/2015    Vitamin D deficiency 03/15/2015     History reviewed. No pertinent surgical history.  Family History   Problem Relation Name Age of Onset    Arthritis Mother      Allergies Mother      Thrombocytopenia Mother      No Known Problems Father       Social History     Tobacco Use    Smoking status: Former     Current packs/day: 0.50     Types: Cigarettes    Smokeless tobacco: Never   Vaping Use    Vaping status: Never Used   Substance Use Topics    Alcohol use: Not Currently     Alcohol/week: 1.0 standard drink of alcohol     Types: 1 Cans of beer per week    Drug use: Yes     Types: Other       Physical Exam   ED Triage Vitals [04/18/24 1732]   Temperature Heart Rate Respirations BP   36.9 °C (98.4 °F) (!) 115 20 (!) 135/97      Pulse Ox Temp src Heart Rate Source Patient Position   100 % -- -- --      BP Location FiO2 (%)     -- --       Physical Exam  Vitals and nursing note reviewed.   Constitutional:       Appearance: Normal appearance.   HENT:      Head: Normocephalic.      Mouth/Throat:      Mouth: Mucous  membranes are moist.   Musculoskeletal:      Cervical back: Normal range of motion.      Comments: Tenderness to the left fourth and fifth metatarsal region, able to flex and extend at level of ankle, no malleoli or tenderness   Skin:     General: Skin is warm.   Neurological:      General: No focal deficit present.      Mental Status: He is alert and oriented to person, place, and time.   Psychiatric:         Mood and Affect: Mood normal.         ED Course & MDM   Diagnoses as of 04/18/24 1813   Foot sprain, left, initial encounter   Diastolic hypertension       Medical Decision Making  I have seen and evaluated this patient.  The attending physician has also seen and evaluated this patient.  Vital signs, laboratory testing and diagnostic images if applicable have been reviewed.  All laboratory and imaging is interpreted by myself unless otherwise stated.  Radiology studies are also formally interpreted by radiologist.    X-ray negative for acute fracture.  Evidence of old fracture.  Most consistent with foot sprain.  Given postop shoe podiatry referral anti-inflammatory medication.  Released in good condition    Labs Reviewed - No data to display  XR foot left 3+ views   Final Result    Remote 4th and 5th metatarsal fractures, healed. No acute findings.          Signed by: Mars Maxwell 4/18/2024 5:56 PM    Dictation workstation:   [id number]     Medications  ketorolac (Toradol) injection 30 mg (has no administration in time range)  acetaminophen (Tylenol) tablet 650 mg (has no administration in time range)  New Prescriptions  NAPROXEN (NAPROSYN) 375 MG TABLET     Take 1 tablet (375 mg) by mouth 2 times a day with meals for 10 days.            Procedure  Procedures     Jaspal Joiner PA-C  04/18/24 1815

## 2024-04-18 NOTE — DISCHARGE INSTRUCTIONS
Follow-up with your primary care physician within 1 to 2 days for further management of your current symptoms and repeat check of your blood pressure.      Follow-up with podiatry within 1 week for further management of your foot pain      Return to the emergency department sooner with worsening of symptoms or onset of new symptoms

## 2024-04-18 NOTE — Clinical Note
Yvon Parks was seen and treated in our emergency department on 4/18/2024.  He may return to work on 04/19/2024.  No prolonged standing or walking, will need to use crutches     If you have any questions or concerns, please don't hesitate to call.      Elen Hamlin MD

## 2024-05-31 ENCOUNTER — APPOINTMENT (OUTPATIENT)
Dept: RADIOLOGY | Facility: HOSPITAL | Age: 29
End: 2024-05-31
Payer: COMMERCIAL

## 2024-05-31 ENCOUNTER — HOSPITAL ENCOUNTER (EMERGENCY)
Facility: HOSPITAL | Age: 29
Discharge: HOME | End: 2024-05-31
Attending: STUDENT IN AN ORGANIZED HEALTH CARE EDUCATION/TRAINING PROGRAM
Payer: COMMERCIAL

## 2024-05-31 VITALS
WEIGHT: 308 LBS | DIASTOLIC BLOOD PRESSURE: 82 MMHG | HEART RATE: 79 BPM | TEMPERATURE: 97.7 F | RESPIRATION RATE: 16 BRPM | SYSTOLIC BLOOD PRESSURE: 129 MMHG | OXYGEN SATURATION: 100 % | BODY MASS INDEX: 37.49 KG/M2

## 2024-05-31 DIAGNOSIS — M79.671 ACUTE FOOT PAIN, RIGHT: Primary | ICD-10-CM

## 2024-05-31 PROCEDURE — 2500000004 HC RX 250 GENERAL PHARMACY W/ HCPCS (ALT 636 FOR OP/ED): Performed by: STUDENT IN AN ORGANIZED HEALTH CARE EDUCATION/TRAINING PROGRAM

## 2024-05-31 PROCEDURE — 73630 X-RAY EXAM OF FOOT: CPT | Mod: RT

## 2024-05-31 PROCEDURE — 96372 THER/PROPH/DIAG INJ SC/IM: CPT | Performed by: STUDENT IN AN ORGANIZED HEALTH CARE EDUCATION/TRAINING PROGRAM

## 2024-05-31 PROCEDURE — 99283 EMERGENCY DEPT VISIT LOW MDM: CPT | Mod: 25

## 2024-05-31 PROCEDURE — 73630 X-RAY EXAM OF FOOT: CPT | Mod: RIGHT SIDE | Performed by: RADIOLOGY

## 2024-05-31 RX ORDER — KETOROLAC TROMETHAMINE 30 MG/ML
30 INJECTION, SOLUTION INTRAMUSCULAR; INTRAVENOUS ONCE
Status: COMPLETED | OUTPATIENT
Start: 2024-05-31 | End: 2024-05-31

## 2024-05-31 RX ORDER — KETOROLAC TROMETHAMINE 10 MG/1
10 TABLET, FILM COATED ORAL EVERY 6 HOURS PRN
Qty: 20 TABLET | Refills: 0 | Status: SHIPPED | OUTPATIENT
Start: 2024-05-31 | End: 2024-06-05

## 2024-05-31 RX ADMIN — KETOROLAC TROMETHAMINE 30 MG: 30 INJECTION, SOLUTION INTRAMUSCULAR at 08:03

## 2024-05-31 ASSESSMENT — PAIN - FUNCTIONAL ASSESSMENT
PAIN_FUNCTIONAL_ASSESSMENT: 0-10

## 2024-05-31 ASSESSMENT — PAIN DESCRIPTION - DESCRIPTORS: DESCRIPTORS: THROBBING;SHARP

## 2024-05-31 ASSESSMENT — PAIN SCALES - GENERAL
PAINLEVEL_OUTOF10: 8
PAINLEVEL_OUTOF10: 0 - NO PAIN
PAINLEVEL_OUTOF10: 8
PAINLEVEL_OUTOF10: 0 - NO PAIN

## 2024-05-31 ASSESSMENT — COLUMBIA-SUICIDE SEVERITY RATING SCALE - C-SSRS
1. IN THE PAST MONTH, HAVE YOU WISHED YOU WERE DEAD OR WISHED YOU COULD GO TO SLEEP AND NOT WAKE UP?: NO
2. HAVE YOU ACTUALLY HAD ANY THOUGHTS OF KILLING YOURSELF?: NO
6. HAVE YOU EVER DONE ANYTHING, STARTED TO DO ANYTHING, OR PREPARED TO DO ANYTHING TO END YOUR LIFE?: NO

## 2024-05-31 ASSESSMENT — PAIN DESCRIPTION - LOCATION: LOCATION: FOOT

## 2024-05-31 ASSESSMENT — PAIN DESCRIPTION - PAIN TYPE: TYPE: ACUTE PAIN

## 2024-05-31 ASSESSMENT — PAIN DESCRIPTION - ORIENTATION: ORIENTATION: RIGHT

## 2024-05-31 NOTE — ED PROVIDER NOTES
HPI   Chief Complaint   Patient presents with    Foot Injury       This is a 28-year-old male with a past medical history of hypertension presenting to the ED for evaluation of right foot injury.  He states that he has had multiple fractures to the fifth metatarsal on the right foot, he states this feels similar.  He denies any fall or injury, states that he was working last night and the night before.  He notes that when he puts more weight on the foot it tends to hurt more.  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  He has followed up with a podiatrist in the past and they have recommended surgery 4 years ago but he did not have this done.  He states that he was seen within the past couple of months for a sprain on the other foot but has not injured the right foot in a while.  He has tried Tylenol, Biofreeze patches, muscle relaxers at home without improvement of his symptoms.      History provided by:  Patient   used: No                        [place] Coma Scale Score: 15                     Patient History   Past Medical History:   Diagnosis Date    Anxiety 10/30/2023    Arthritis     Chronic GERD 10/06/2023    Elevated blood pressure reading without diagnosis of hypertension 09/27/2015    Lymphedema 10/30/2023    Mixed hyperlipidemia 09/27/2015    Vitamin D deficiency 03/15/2015     History reviewed. No pertinent surgical history.  Family History   Problem Relation Name Age of Onset    Arthritis Mother      Allergies Mother      Thrombocytopenia Mother      No Known Problems Father       Social History     Tobacco Use    Smoking status: Former     Current packs/day: 0.50     Types: Cigarettes    Smokeless tobacco: Never   Vaping Use    Vaping status: Never Used   Substance Use Topics    Alcohol use: Not Currently     Alcohol/week: 1.0 standard drink of alcohol     Types: 1  Cans of beer per week    Drug use: Yes     Types: Other       Physical Exam   ED Triage Vitals [05/31/24 0750]   Temperature Heart Rate Respirations BP   36.5 °C (97.7 °F) 87 18 149/86      Pulse Ox Temp Source Heart Rate Source Patient Position   98 % Skin -- Lying      BP Location FiO2 (%)     Left arm --       Physical Exam  General: well developed, overweight 28-year-old male is awake and alert, in no apparent distress  CV: Dorsalis pedis pulse +2/4 to the affected foot, brisk upper refill intact to all toes less than 2 seconds  MSK: Tender to palpation over the distal fifth metatarsal bone without visible swelling, without palpable deformity.  No erythema to the foot.  No blistering, no crepitus.  No warmth to the affected area.  No signs of local infection.  Neuro: Sensation fully intact to the affected foot  Psych: appropriate mood and affect, cooperative with exam  Skin: warm, dry, without evidence of rash or abrasions    ED Course & MDM   Diagnoses as of 05/31/24 0847   Acute foot pain, right       Medical Decision Making  He is in no acute distress here, complains of severe pain at the distal right fifth metatarsal bone when putting weight on it.  The area is tender to palpation, there are no signs of infection including erythema, warmth, joint swelling, crepitus, fluctuance.  He has no systemic signs of infection including fevers or chills, nausea or vomiting to suggest local infection.  He has a history of fracture in this bone multiple times, he notes that he does put a lot of weight on that side of the foot when standing and ambulating which likely predisposes him to injure this area of the foot.  He was given IM Toradol for pain control here.  X-ray of the foot ordered.  I reviewed the x-ray myself.  No fracture was visualized.  This is confirmed by the radiologist.  Concern management of this injury was discussed with the patient, he was prescribed medication use for pain control as an outpatient.   Postop shoe given to take pressure off of the affected area.  He is to follow-up with podiatry for further outpatient management. Post op shoe provided to take pressure off of the affected area.    XR foot right 3+ views   Final Result   Negative             MACRO:   None        Signed by: Joseph Schoenberger 5/31/2024 8:35 AM   Dictation workstation:   [id number]            Procedure  Procedures     Dominik Crespo DO  05/31/24 0821

## 2024-05-31 NOTE — ED TRIAGE NOTES
Pt state that he injured his R foot multiple times and never heal properly. Today in ED for an Xray after he was in pain all night

## 2024-05-31 NOTE — DISCHARGE INSTRUCTIONS
Take Toradol, Tylenol as needed for pain, use ice, rest and take weight off the foot is much as you can.  You should follow-up with Dr. Mooney the podiatrist for further outpatient assessment of this.  Return to ED if you develop any signs of infection like redness, warmth, swelling, blistering to the foot in the area that is affected, fevers, or for any new or worsening symptoms that you feel require emergent evaluation by physician.  Otherwise take your weight off the foot is much as you can and rest the area.  You can wear the postop shoe which count to take the weight off of it as well.

## 2024-06-17 ENCOUNTER — HOSPITAL ENCOUNTER (EMERGENCY)
Facility: HOSPITAL | Age: 29
Discharge: HOME | End: 2024-06-17

## 2024-06-17 VITALS
BODY MASS INDEX: 38.36 KG/M2 | SYSTOLIC BLOOD PRESSURE: 140 MMHG | TEMPERATURE: 98.2 F | HEIGHT: 76 IN | WEIGHT: 315 LBS | DIASTOLIC BLOOD PRESSURE: 77 MMHG | OXYGEN SATURATION: 99 % | RESPIRATION RATE: 17 BRPM | HEART RATE: 97 BPM

## 2024-06-17 DIAGNOSIS — L23.7 POISON IVY: Primary | ICD-10-CM

## 2024-06-17 PROCEDURE — 2500000004 HC RX 250 GENERAL PHARMACY W/ HCPCS (ALT 636 FOR OP/ED)

## 2024-06-17 PROCEDURE — 99284 EMERGENCY DEPT VISIT MOD MDM: CPT | Mod: 25

## 2024-06-17 PROCEDURE — 96374 THER/PROPH/DIAG INJ IV PUSH: CPT

## 2024-06-17 PROCEDURE — 96361 HYDRATE IV INFUSION ADD-ON: CPT

## 2024-06-17 PROCEDURE — 96375 TX/PRO/DX INJ NEW DRUG ADDON: CPT

## 2024-06-17 RX ORDER — DIPHENHYDRAMINE HYDROCHLORIDE 50 MG/ML
50 INJECTION INTRAMUSCULAR; INTRAVENOUS ONCE
Status: COMPLETED | OUTPATIENT
Start: 2024-06-17 | End: 2024-06-17

## 2024-06-17 RX ORDER — FAMOTIDINE 10 MG/ML
40 INJECTION INTRAVENOUS ONCE
Status: COMPLETED | OUTPATIENT
Start: 2024-06-17 | End: 2024-06-17

## 2024-06-17 RX ORDER — METHYLPREDNISOLONE 4 MG/1
TABLET ORAL
Qty: 21 TABLET | Refills: 1 | Status: SHIPPED | OUTPATIENT
Start: 2024-06-17 | End: 2024-06-24

## 2024-06-17 ASSESSMENT — PAIN - FUNCTIONAL ASSESSMENT: PAIN_FUNCTIONAL_ASSESSMENT: 0-10

## 2024-06-17 ASSESSMENT — PAIN SCALES - GENERAL: PAINLEVEL_OUTOF10: 2

## 2024-06-17 ASSESSMENT — PAIN DESCRIPTION - LOCATION: LOCATION: GENERALIZED

## 2024-06-17 NOTE — Clinical Note
Yvon Parks was seen and treated in our emergency department on 6/17/2024.  He may return to work on 06/21/2024.       If you have any questions or concerns, please don't hesitate to call.      Efren Hoang PA-C

## 2024-06-17 NOTE — ED PROVIDER NOTES
HPI   Chief Complaint   Patient presents with    Poison Jelena     Poison ivy x2 days in left eye and on arms, has had poor reactions to this in the past.        HPI  Patient is a 28-year-old male who presents to ED for poison ivy exposure and associated rash and swelling.  Patient denies any difficulty breathing, swelling of the throat, known allergies.  Patient did take Benadryl prior to arrival.  Patient has no other acute complaints today.  He is otherwise healthy.                  [place] Coma Scale Score: 15                     Patient History   Past Medical History:   Diagnosis Date    Anxiety 10/30/2023    Arthritis     Chronic GERD 10/06/2023    Elevated blood pressure reading without diagnosis of hypertension 09/27/2015    Lymphedema 10/30/2023    Mixed hyperlipidemia 09/27/2015    Vitamin D deficiency 03/15/2015     History reviewed. No pertinent surgical history.  Family History   Problem Relation Name Age of Onset    Arthritis Mother      Allergies Mother      Thrombocytopenia Mother      No Known Problems Father       Social History     Tobacco Use    Smoking status: Former     Current packs/day: 0.50     Types: Cigarettes    Smokeless tobacco: Never   Vaping Use    Vaping status: Never Used   Substance Use Topics    Alcohol use: Not Currently     Alcohol/week: 1.0 standard drink of alcohol     Types: 1 Cans of beer per week    Drug use: Yes     Types: Other       Physical Exam   ED Triage Vitals [06/17/24 1022]   Temperature Heart Rate Respirations BP   36.8 °C (98.2 °F) 96 18 150/78      Pulse Ox Temp Source Heart Rate Source Patient Position   98 % Temporal -- --      BP Location FiO2 (%)     -- --       Physical Exam  Vitals reviewed.   Constitutional:       Appearance: Normal appearance.   HENT:      Head: Normocephalic and atraumatic.   Eyes:      Extraocular Movements: Extraocular movements intact.   Cardiovascular:      Rate and Rhythm: Normal rate and regular rhythm.   Pulmonary:      Effort:  Pulmonary effort is normal.      Breath sounds: Normal breath sounds.   Abdominal:      General: Abdomen is flat.      Palpations: Abdomen is soft.      Tenderness: There is no abdominal tenderness.   Musculoskeletal:         General: Normal range of motion.      Cervical back: Normal range of motion and neck supple.   Skin:     General: Skin is warm and dry.      Findings: Rash present.   Neurological:      General: No focal deficit present.      Mental Status: He is alert and oriented to person, place, and time.   Psychiatric:         Mood and Affect: Mood normal.         Behavior: Behavior normal.         ED Course & MDM   Diagnoses as of 06/17/24 1239   Poison ivy       Medical Decision Making  Parts of this chart have been completed using voice recognition software. Please excuse any errors of transcription.  My thought process and reason for plan has been formulated from the time that I saw the patient until the time of disposition and is not specific to one specific moment during their visit and furthermore my MDM encompasses this entire chart and not only this text box.    HPI:   Detailed above.    Exam:   A medically appropriate exam performed, outlined above, given the known history and presentation.    History obtained from:   Patient    EKG/Cardiac monitor:     Social Determinants of Health considered during this visit:     Medications given during visit:  Medications   sodium chloride 0.9 % bolus 1,000 mL (0 mL intravenous Stopped 6/17/24 1204)   famotidine PF (Pepcid) injection 40 mg (40 mg intravenous Given 6/17/24 1106)   diphenhydrAMINE (BENADryl) injection 50 mg (50 mg intravenous Given 6/17/24 1106)   methylPREDNISolone sod succinate (SOLU-Medrol) injection 125 mg (125 mg intravenous Given 6/17/24 1105)        Diagnostic/tests:  Labs Reviewed - No data to display   No orders to display        Differential Diagnosis:       Consultations:      Procedures:      Critical  Care:      Referrals:      Discharge Prescriptions:      MDM Summary:  Patient is feeling better after medications.    We have discussed the diagnosis and risks, and we agree with discharging home to follow-up with appropriate physician as directed. We also discussed returning to the Emergency Department immediately if new or worsening symptoms occur. We have discussed the symptoms which are most concerning that necessitate immediate return. Pt symptoms have been well controlled here and the patient is safe for discharge with appropriate outpatient follow up. The patient has verbalized understanding to return to ER without delay for new or worsening pains or for any other symptoms or concerns. I utilized the discharge clinical management tool provided [employer] to help estimate risk of negative outcome for this patient.      Disposition:  The patient was discharged      Procedure  Procedures     Efren Hoang PA-C  06/17/24 1240

## 2024-06-19 RX ORDER — BETAMETHASONE DIPROPIONATE 0.5 MG/G
1 OINTMENT TOPICAL 2 TIMES DAILY
Qty: 15 G | Refills: 0 | Status: SHIPPED | OUTPATIENT
Start: 2024-06-19 | End: 2024-07-03

## 2024-07-07 ENCOUNTER — HOSPITAL ENCOUNTER (EMERGENCY)
Facility: HOSPITAL | Age: 29
Discharge: HOME | End: 2024-07-07
Attending: EMERGENCY MEDICINE

## 2024-07-07 VITALS
HEIGHT: 76 IN | HEART RATE: 111 BPM | DIASTOLIC BLOOD PRESSURE: 99 MMHG | OXYGEN SATURATION: 98 % | RESPIRATION RATE: 16 BRPM | SYSTOLIC BLOOD PRESSURE: 177 MMHG | WEIGHT: 315 LBS | BODY MASS INDEX: 38.36 KG/M2 | TEMPERATURE: 99.1 F

## 2024-07-07 DIAGNOSIS — J02.9 ACUTE PHARYNGITIS, UNSPECIFIED ETIOLOGY: Primary | ICD-10-CM

## 2024-07-07 DIAGNOSIS — I10 HYPERTENSION, UNSPECIFIED TYPE: ICD-10-CM

## 2024-07-07 LAB — S PYO DNA THROAT QL NAA+PROBE: NOT DETECTED

## 2024-07-07 PROCEDURE — 96372 THER/PROPH/DIAG INJ SC/IM: CPT | Performed by: EMERGENCY MEDICINE

## 2024-07-07 PROCEDURE — 99283 EMERGENCY DEPT VISIT LOW MDM: CPT

## 2024-07-07 PROCEDURE — 87651 STREP A DNA AMP PROBE: CPT | Performed by: EMERGENCY MEDICINE

## 2024-07-07 PROCEDURE — 2500000002 HC RX 250 W HCPCS SELF ADMINISTERED DRUGS (ALT 637 FOR MEDICARE OP, ALT 636 FOR OP/ED): Performed by: EMERGENCY MEDICINE

## 2024-07-07 PROCEDURE — 2500000004 HC RX 250 GENERAL PHARMACY W/ HCPCS (ALT 636 FOR OP/ED): Performed by: EMERGENCY MEDICINE

## 2024-07-07 RX ORDER — KETOROLAC TROMETHAMINE 30 MG/ML
30 INJECTION, SOLUTION INTRAMUSCULAR; INTRAVENOUS ONCE
Status: COMPLETED | OUTPATIENT
Start: 2024-07-07 | End: 2024-07-07

## 2024-07-07 RX ORDER — DEXAMETHASONE 6 MG/1
6 TABLET ORAL DAILY
Qty: 4 TABLET | Refills: 0 | Status: SHIPPED | OUTPATIENT
Start: 2024-07-07 | End: 2024-07-11

## 2024-07-07 RX ORDER — DEXAMETHASONE 6 MG/1
6 TABLET ORAL ONCE
Status: COMPLETED | OUTPATIENT
Start: 2024-07-07 | End: 2024-07-07

## 2024-07-07 RX ORDER — IBUPROFEN 800 MG/1
800 TABLET ORAL 3 TIMES DAILY
Qty: 12 TABLET | Refills: 0 | Status: SHIPPED | OUTPATIENT
Start: 2024-07-07 | End: 2024-07-11

## 2024-07-07 RX ORDER — PENICILLIN V POTASSIUM 250 MG/1
500 TABLET, FILM COATED ORAL ONCE
Status: COMPLETED | OUTPATIENT
Start: 2024-07-07 | End: 2024-07-07

## 2024-07-07 RX ORDER — PENICILLIN V POTASSIUM 500 MG/1
500 TABLET, FILM COATED ORAL 4 TIMES DAILY
Qty: 28 TABLET | Refills: 0 | Status: SHIPPED | OUTPATIENT
Start: 2024-07-07 | End: 2024-07-14

## 2024-07-07 ASSESSMENT — COLUMBIA-SUICIDE SEVERITY RATING SCALE - C-SSRS
6. HAVE YOU EVER DONE ANYTHING, STARTED TO DO ANYTHING, OR PREPARED TO DO ANYTHING TO END YOUR LIFE?: NO
2. HAVE YOU ACTUALLY HAD ANY THOUGHTS OF KILLING YOURSELF?: NO
1. IN THE PAST MONTH, HAVE YOU WISHED YOU WERE DEAD OR WISHED YOU COULD GO TO SLEEP AND NOT WAKE UP?: NO

## 2024-07-07 ASSESSMENT — PAIN SCALES - GENERAL: PAINLEVEL_OUTOF10: 6

## 2024-07-07 ASSESSMENT — PAIN - FUNCTIONAL ASSESSMENT: PAIN_FUNCTIONAL_ASSESSMENT: 0-10

## 2024-07-07 NOTE — LETTER
July 7, 2024    Patient: Yvon Parks   YOB: 1995   Date of Visit: 7/7/2024       To Whom It May Concern:    Yvon Parks was seen and treated in our emergency department on 7/7/2024. He  may return to work 7/9/24 .    If you have any questions or concerns, please don't hesitate to call.

## 2024-07-08 NOTE — ED PROVIDER NOTES
HPI   Chief Complaint   Patient presents with    Sore Throat     Pt presents to the ED with c/c of sore throat for the last week. Pt states that he also has ear pain. Per pt he has tried everything over the counter but nothing has helped to date and now he is having issues swallowing liquids.        HPI  28-year-old male presents with complaint of sore throat.  Patient's had a sore throat for the last week.  He also states he been running fevers and has congestion and pressure in his ears.  Been taking Flonase and over-the-counter medicine.  Hurts worse when he swallows.  No chest pain or shortness of breath.  No nausea or vomiting.  No other complaints.                  No data recorded                   Patient History   Past Medical History:   Diagnosis Date    Anxiety 10/30/2023    Arthritis     Chronic GERD 10/06/2023    Elevated blood pressure reading without diagnosis of hypertension 09/27/2015    Lymphedema 10/30/2023    Mixed hyperlipidemia 09/27/2015    Vitamin D deficiency 03/15/2015     No past surgical history on file.  Family History   Problem Relation Name Age of Onset    Arthritis Mother      Allergies Mother      Thrombocytopenia Mother      No Known Problems Father       Social History     Tobacco Use    Smoking status: Former     Current packs/day: 0.50     Types: Cigarettes    Smokeless tobacco: Never   Vaping Use    Vaping status: Never Used   Substance Use Topics    Alcohol use: Not Currently     Alcohol/week: 1.0 standard drink of alcohol     Types: 1 Cans of beer per week    Drug use: Yes     Types: Other       Physical Exam   ED Triage Vitals [07/07/24 2138]   Temperature Heart Rate Respirations BP   37.3 °C (99.1 °F) (!) 111 16 (!) 177/99      Pulse Ox Temp Source Heart Rate Source Patient Position   98 % Temporal Monitor Sitting      BP Location FiO2 (%)     Left arm --       Physical Exam  General:  Awake, alert, no acute distress.    Head: Normocephalic, Atraumatic  Eyes: Conjunctival  clear  Ears:  TMs are clear bilaterally, external auditory canals clear  Throat: Posterior pharynx erythematous with petechiae.  Uvula midline, no exudative pharyngitis  Neck: Supple, trachea midline, no stridor  Skin: Warm and dry, no rashes   Lungs: Clear to auscultation bilaterally no acute respiratory distress  CV: Regular Rate Rhythm with no obvious murmurs gallops rubs noted,   Abdomen: Soft, nontender, nondistended, positive bowel sounds, no peritoneal signs  Musculoskeletal:  Full range of motion in all 4 extremities    ED Course & MDM   Diagnoses as of 07/07/24 2238   Acute pharyngitis, unspecified etiology   Hypertension, unspecified type       Medical Decision Making  Patient be treated for strep pharyngitis with Pen-Vee K.  Given a dose Pen-Vee K in the emergency department, Toradol, Decadron.  Prescription for Decadron for the next 4 days.  Advised to continue Tylenol Motrin.  Follow-up with her doctor.  He is hypertensive in the emerged department.  I did discuss this with him at bedside.  He was aware however his doctor has not started him on blood pressure medication.  Stable for discharge.    Procedure  Procedures     David Thornton DO  07/07/24 0525

## 2024-12-15 ENCOUNTER — HOSPITAL ENCOUNTER (EMERGENCY)
Facility: HOSPITAL | Age: 29
Discharge: HOME | End: 2024-12-15
Payer: COMMERCIAL

## 2024-12-15 ENCOUNTER — APPOINTMENT (OUTPATIENT)
Dept: CARDIOLOGY | Facility: HOSPITAL | Age: 29
End: 2024-12-15
Payer: COMMERCIAL

## 2024-12-15 ENCOUNTER — APPOINTMENT (OUTPATIENT)
Dept: RADIOLOGY | Facility: HOSPITAL | Age: 29
End: 2024-12-15
Payer: COMMERCIAL

## 2024-12-15 VITALS
DIASTOLIC BLOOD PRESSURE: 89 MMHG | WEIGHT: 315 LBS | OXYGEN SATURATION: 96 % | HEART RATE: 96 BPM | HEIGHT: 76 IN | TEMPERATURE: 97.9 F | RESPIRATION RATE: 18 BRPM | SYSTOLIC BLOOD PRESSURE: 153 MMHG | BODY MASS INDEX: 38.36 KG/M2

## 2024-12-15 DIAGNOSIS — J06.9 VIRAL UPPER RESPIRATORY INFECTION: Primary | ICD-10-CM

## 2024-12-15 LAB
ALBUMIN SERPL BCP-MCNC: 4.2 G/DL (ref 3.4–5)
ALP SERPL-CCNC: 72 U/L (ref 33–120)
ALT SERPL W P-5'-P-CCNC: 39 U/L (ref 10–52)
ANION GAP SERPL CALCULATED.3IONS-SCNC: 13 MMOL/L (ref 10–20)
AST SERPL W P-5'-P-CCNC: 30 U/L (ref 9–39)
BASOPHILS # BLD AUTO: 0.05 X10*3/UL (ref 0–0.1)
BASOPHILS NFR BLD AUTO: 0.7 %
BILIRUB SERPL-MCNC: 0.4 MG/DL (ref 0–1.2)
BNP SERPL-MCNC: 10 PG/ML (ref 0–99)
BUN SERPL-MCNC: 9 MG/DL (ref 6–23)
CALCIUM SERPL-MCNC: 9 MG/DL (ref 8.6–10.3)
CARDIAC TROPONIN I PNL SERPL HS: 3 NG/L (ref 0–20)
CARDIAC TROPONIN I PNL SERPL HS: 4 NG/L (ref 0–20)
CHLORIDE SERPL-SCNC: 104 MMOL/L (ref 98–107)
CO2 SERPL-SCNC: 24 MMOL/L (ref 21–32)
CREAT SERPL-MCNC: 0.79 MG/DL (ref 0.5–1.3)
EGFRCR SERPLBLD CKD-EPI 2021: >90 ML/MIN/1.73M*2
EOSINOPHIL # BLD AUTO: 0.16 X10*3/UL (ref 0–0.7)
EOSINOPHIL NFR BLD AUTO: 2.1 %
ERYTHROCYTE [DISTWIDTH] IN BLOOD BY AUTOMATED COUNT: 12.1 % (ref 11.5–14.5)
FLUAV RNA RESP QL NAA+PROBE: NOT DETECTED
FLUBV RNA RESP QL NAA+PROBE: NOT DETECTED
GLUCOSE SERPL-MCNC: 91 MG/DL (ref 74–99)
HCT VFR BLD AUTO: 46.7 % (ref 41–52)
HGB BLD-MCNC: 15.7 G/DL (ref 13.5–17.5)
IMM GRANULOCYTES # BLD AUTO: 0.01 X10*3/UL (ref 0–0.7)
IMM GRANULOCYTES NFR BLD AUTO: 0.1 % (ref 0–0.9)
LYMPHOCYTES # BLD AUTO: 1.91 X10*3/UL (ref 1.2–4.8)
LYMPHOCYTES NFR BLD AUTO: 25 %
MCH RBC QN AUTO: 29.2 PG (ref 26–34)
MCHC RBC AUTO-ENTMCNC: 33.6 G/DL (ref 32–36)
MCV RBC AUTO: 87 FL (ref 80–100)
MONOCYTES # BLD AUTO: 0.71 X10*3/UL (ref 0.1–1)
MONOCYTES NFR BLD AUTO: 9.3 %
NEUTROPHILS # BLD AUTO: 4.79 X10*3/UL (ref 1.2–7.7)
NEUTROPHILS NFR BLD AUTO: 62.8 %
NRBC BLD-RTO: 0 /100 WBCS (ref 0–0)
PLATELET # BLD AUTO: 280 X10*3/UL (ref 150–450)
POTASSIUM SERPL-SCNC: 3.6 MMOL/L (ref 3.5–5.3)
PROT SERPL-MCNC: 7.7 G/DL (ref 6.4–8.2)
RBC # BLD AUTO: 5.38 X10*6/UL (ref 4.5–5.9)
RSV RNA RESP QL NAA+PROBE: NOT DETECTED
SARS-COV-2 RNA RESP QL NAA+PROBE: NOT DETECTED
SODIUM SERPL-SCNC: 137 MMOL/L (ref 136–145)
WBC # BLD AUTO: 7.6 X10*3/UL (ref 4.4–11.3)

## 2024-12-15 PROCEDURE — 84484 ASSAY OF TROPONIN QUANT: CPT

## 2024-12-15 PROCEDURE — 71045 X-RAY EXAM CHEST 1 VIEW: CPT | Performed by: STUDENT IN AN ORGANIZED HEALTH CARE EDUCATION/TRAINING PROGRAM

## 2024-12-15 PROCEDURE — 80053 COMPREHEN METABOLIC PANEL: CPT

## 2024-12-15 PROCEDURE — 71045 X-RAY EXAM CHEST 1 VIEW: CPT

## 2024-12-15 PROCEDURE — 87637 SARSCOV2&INF A&B&RSV AMP PRB: CPT

## 2024-12-15 PROCEDURE — 36415 COLL VENOUS BLD VENIPUNCTURE: CPT

## 2024-12-15 PROCEDURE — 93005 ELECTROCARDIOGRAM TRACING: CPT

## 2024-12-15 PROCEDURE — 85025 COMPLETE CBC W/AUTO DIFF WBC: CPT

## 2024-12-15 PROCEDURE — 96374 THER/PROPH/DIAG INJ IV PUSH: CPT

## 2024-12-15 PROCEDURE — 83880 ASSAY OF NATRIURETIC PEPTIDE: CPT

## 2024-12-15 PROCEDURE — 99285 EMERGENCY DEPT VISIT HI MDM: CPT | Mod: 25

## 2024-12-15 PROCEDURE — 2500000004 HC RX 250 GENERAL PHARMACY W/ HCPCS (ALT 636 FOR OP/ED)

## 2024-12-15 RX ORDER — KETOROLAC TROMETHAMINE 30 MG/ML
30 INJECTION, SOLUTION INTRAMUSCULAR; INTRAVENOUS ONCE
Status: COMPLETED | OUTPATIENT
Start: 2024-12-15 | End: 2024-12-15

## 2024-12-15 RX ADMIN — KETOROLAC TROMETHAMINE 30 MG: 30 INJECTION, SOLUTION INTRAMUSCULAR at 21:48

## 2024-12-15 ASSESSMENT — PAIN DESCRIPTION - PAIN TYPE: TYPE: ACUTE PAIN

## 2024-12-15 ASSESSMENT — PAIN DESCRIPTION - ONSET: ONSET: SUDDEN

## 2024-12-15 ASSESSMENT — PAIN DESCRIPTION - PROGRESSION
CLINICAL_PROGRESSION: GRADUALLY IMPROVING
CLINICAL_PROGRESSION: NOT CHANGED

## 2024-12-15 ASSESSMENT — PAIN DESCRIPTION - ORIENTATION: ORIENTATION: LEFT

## 2024-12-15 ASSESSMENT — PAIN - FUNCTIONAL ASSESSMENT
PAIN_FUNCTIONAL_ASSESSMENT: 0-10
PAIN_FUNCTIONAL_ASSESSMENT: 0-10

## 2024-12-15 ASSESSMENT — COLUMBIA-SUICIDE SEVERITY RATING SCALE - C-SSRS
1. IN THE PAST MONTH, HAVE YOU WISHED YOU WERE DEAD OR WISHED YOU COULD GO TO SLEEP AND NOT WAKE UP?: NO
6. HAVE YOU EVER DONE ANYTHING, STARTED TO DO ANYTHING, OR PREPARED TO DO ANYTHING TO END YOUR LIFE?: NO
2. HAVE YOU ACTUALLY HAD ANY THOUGHTS OF KILLING YOURSELF?: NO

## 2024-12-15 ASSESSMENT — PAIN SCALES - GENERAL
PAINLEVEL_OUTOF10: 3
PAINLEVEL_OUTOF10: 5 - MODERATE PAIN

## 2024-12-15 ASSESSMENT — PAIN DESCRIPTION - DESCRIPTORS: DESCRIPTORS: ACHING;DULL;SHARP

## 2024-12-15 ASSESSMENT — PAIN DESCRIPTION - LOCATION: LOCATION: CHEST

## 2024-12-15 ASSESSMENT — PAIN DESCRIPTION - FREQUENCY: FREQUENCY: CONSTANT/CONTINUOUS

## 2024-12-15 NOTE — Clinical Note
Yvon Parks was seen and treated in our emergency department on 12/15/2024.  He may return to work on 12/19/2024.       If you have any questions or concerns, please don't hesitate to call.      Efren Hoang PA-C

## 2024-12-16 LAB
ATRIAL RATE: 88 BPM
P AXIS: 28 DEGREES
P OFFSET: 216 MS
P ONSET: 158 MS
PR INTERVAL: 128 MS
Q ONSET: 222 MS
QRS COUNT: 14 BEATS
QRS DURATION: 94 MS
QT INTERVAL: 346 MS
QTC CALCULATION(BAZETT): 418 MS
QTC FREDERICIA: 393 MS
R AXIS: -4 DEGREES
T AXIS: 14 DEGREES
T OFFSET: 395 MS
VENTRICULAR RATE: 88 BPM

## 2024-12-16 NOTE — ED PROVIDER NOTES
HPI   Chief Complaint   Patient presents with    Chest Pain    Flu Symptoms     Pt has had flu like symptoms for the last week and started to have some left sided chest pain this morning. Pt has cardiac hx.       HPI  Patient is a 28-year-old male who presents to ED for flulike symptoms x 10 days.  Patient reports chest pain started today.  He reports left-sided chest pain that does not radiate, not made better or worse, no associated shortness of breath, it is not exertional.  Patient has had a heart cath in the past but results were normal, no blockages.  Patient denies any fever chills, shortness of breath, Chris pain or NVD, urinary symptoms.  He does endorse myalgias and fatigue.  No recent hospitalizations or surgeries.  No recent travel or sick contacts.      Patient History   Past Medical History:   Diagnosis Date    Anxiety 10/30/2023    Arthritis     Chronic GERD 10/06/2023    Elevated blood pressure reading without diagnosis of hypertension 09/27/2015    Lymphedema 10/30/2023    Mixed hyperlipidemia 09/27/2015    Vitamin D deficiency 03/15/2015     No past surgical history on file.  Family History   Problem Relation Name Age of Onset    Arthritis Mother      Allergies Mother      Thrombocytopenia Mother      No Known Problems Father       Social History     Tobacco Use    Smoking status: Former     Current packs/day: 0.50     Types: Cigarettes    Smokeless tobacco: Never   Vaping Use    Vaping status: Never Used   Substance Use Topics    Alcohol use: Not Currently     Alcohol/week: 1.0 standard drink of alcohol     Types: 1 Cans of beer per week    Drug use: Yes     Types: Other       Physical Exam   ED Triage Vitals [12/15/24 1954]   Temperature Heart Rate Respirations BP   36.6 °C (97.9 °F) 96 18 153/89      Pulse Ox Temp Source Heart Rate Source Patient Position   96 % Oral Monitor Sitting      BP Location FiO2 (%)     Right arm --       Physical Exam  Vitals reviewed.   Constitutional:        General: He is not in acute distress.     Appearance: Normal appearance. He is not ill-appearing.   HENT:      Head: Normocephalic and atraumatic.   Eyes:      Extraocular Movements: Extraocular movements intact.   Cardiovascular:      Rate and Rhythm: Normal rate and regular rhythm.      Heart sounds: Normal heart sounds.   Pulmonary:      Effort: Pulmonary effort is normal.      Breath sounds: Normal breath sounds.   Abdominal:      General: Abdomen is flat.      Tenderness: There is no abdominal tenderness.   Musculoskeletal:         General: Normal range of motion.      Cervical back: Normal range of motion and neck supple.   Skin:     General: Skin is warm and dry.   Neurological:      General: No focal deficit present.      Mental Status: He is alert and oriented to person, place, and time.   Psychiatric:         Mood and Affect: Mood normal.         Behavior: Behavior normal.           ED Course & MDM   ED Course as of 12/15/24 2154   Sun Dec 15, 2024   2010 ECG 12 lead  Performed at  2000, HR of 88, NSR, NAD, QTc 418, no sign of STEMI, no Q wave or T wave abnormality noted.    Reviewed and interpreted by me at time performed   [JM]      ED Course User Index  [JM] Nayely Thurston MD         Diagnoses as of 12/15/24 2154   Viral upper respiratory infection                 No data recorded                                 Medical Decision Making  Parts of this chart have been completed using voice recognition software. Please excuse any errors of transcription.  My thought process and reason for plan has been formulated from the time that I saw the patient until the time of disposition and is not specific to one specific moment during their visit and furthermore my MDM encompasses this entire chart and not only this text box.    HPI:   A medically appropriate HPI was obtained, outlined above.    Yvon Parks is a  28 y.o. male    Chief Complaint   Patient presents with    Chest Pain    Flu Symptoms      Pt has had flu like symptoms for the last week and started to have some left sided chest pain this morning. Pt has cardiac hx.       Past Medical History:   Diagnosis Date    Anxiety 10/30/2023    Arthritis     Chronic GERD 10/06/2023    Elevated blood pressure reading without diagnosis of hypertension 09/27/2015    Lymphedema 10/30/2023    Mixed hyperlipidemia 09/27/2015    Vitamin D deficiency 03/15/2015       No past surgical history on file.    Social History     Tobacco Use    Smoking status: Former     Current packs/day: 0.50     Types: Cigarettes    Smokeless tobacco: Never   Vaping Use    Vaping status: Never Used   Substance Use Topics    Alcohol use: Not Currently     Alcohol/week: 1.0 standard drink of alcohol     Types: 1 Cans of beer per week    Drug use: Yes     Types: Other       Family History   Problem Relation Name Age of Onset    Arthritis Mother      Allergies Mother      Thrombocytopenia Mother      No Known Problems Father         Allergies   Allergen Reactions    Levofloxacin Hives, Itching and Unknown     Itching    Mold Runny nose       Current Outpatient Medications   Medication Instructions    ascorbic acid (VITAMIN C) 1,500 mg, oral, Daily    cholecalciferol (VITAMIN D-3) 4,000 Units, oral, Daily    cyclobenzaprine (FLEXERIL) 10 mg, oral, 2 times daily PRN    dexAMETHasone (DECADRON) 6 mg, oral, Daily    dicyclomine (BENTYL) 20 mg, oral, 4 times daily PRN    famotidine (PEPCID) 40 mg, oral, Daily    fluticasone (Flonase) 50 mcg/actuation nasal spray 2 sprays, Each Nostril, Daily    HYDROcodone-acetaminophen (Norco) 5-325 mg tablet 1 tablet, oral, Every 6 hours PRN    mesalamine (LIALDA) 2,400 mg, oral, Daily    multivitamin tablet 1 tablet, oral, Daily    pantoprazole (PROTONIX) 40 mg, oral, Daily    sucralfate (CARAFATE) 1 g, oral, 2 times daily before meals    traZODone (Desyrel) 50 mg tablet 1-2 tablets, oral, Nightly       History obtained from:   Patient  Exam:   Patient Vitals for  "the past 24 hrs:   BP Temp Temp src Pulse Resp SpO2 Height Weight   12/15/24 1954 153/89 36.6 °C (97.9 °F) Oral 96 18 96 % 1.93 m (6' 4\") (!) 155 kg (341 lb 7.9 oz)       A medically appropriate exam performed, outlined above, given the known history and presentation.    EKG/Cardiac monitor:   Interpreted by attending physician, see their note for ED course for more detail.    Social Determinants of Health considered during this visit:   Housing    Medications given during visit:  Medications   ketorolac (Toradol) injection 30 mg (30 mg intravenous Given 12/15/24 2148)        Diagnostic/tests:  Labs Reviewed   COMPREHENSIVE METABOLIC PANEL - Normal       Result Value    Glucose 91      Sodium 137      Potassium 3.6      Chloride 104      Bicarbonate 24      Anion Gap 13      Urea Nitrogen 9      Creatinine 0.79      eGFR >90      Calcium 9.0      Albumin 4.2      Alkaline Phosphatase 72      Total Protein 7.7      AST 30      Bilirubin, Total 0.4      ALT 39     SERIAL TROPONIN-INITIAL - Normal    Troponin I, High Sensitivity 4      Narrative:     Less than 99th percentile of normal range cutoff-  Female and children under 18 years old <14 ng/L; Male <21 ng/L: Negative  Repeat testing should be performed if clinically indicated.     Female and children under 18 years old 14-50 ng/L; Male 21-50 ng/L:  Consistent with possible cardiac damage and possible increased clinical   risk. Serial measurements may help to assess extent of myocardial damage.     >50 ng/L: Consistent with cardiac damage, increased clinical risk and  myocardial infarction. Serial measurements may help assess extent of   myocardial damage.      NOTE: Children less than 1 year old may have higher baseline troponin   levels and results should be interpreted in conjunction with the overall   clinical context.     NOTE: Troponin I testing is performed using a different   testing methodology at [facility] than at other   [facility]. " Direct result comparisons should only   be made within the same method.   SERIAL TROPONIN, 1 HOUR - Normal    Troponin I, High Sensitivity 3      Narrative:     Less than 99th percentile of normal range cutoff-  Female and children under 18 years old <14 ng/L; Male <21 ng/L: Negative  Repeat testing should be performed if clinically indicated.     Female and children under 18 years old 14-50 ng/L; Male 21-50 ng/L:  Consistent with possible cardiac damage and possible increased clinical   risk. Serial measurements may help to assess extent of myocardial damage.     >50 ng/L: Consistent with cardiac damage, increased clinical risk and  myocardial infarction. Serial measurements may help assess extent of   myocardial damage.      NOTE: Children less than 1 year old may have higher baseline troponin   levels and results should be interpreted in conjunction with the overall   clinical context.     NOTE: Troponin I testing is performed using a different   testing methodology at [facility] than at other   [facility]. Direct result comparisons should only   be made within the same method.   B-TYPE NATRIURETIC PEPTIDE - Normal    BNP 10      Narrative:        <100 pg/mL - Heart failure unlikely  100-299 pg/mL - Intermediate probability of acute heart                  failure exacerbation. Correlate with clinical                  context and patient history.    >=300 pg/mL - Heart Failure likely. Correlate with clinical                  context and patient history.    BNP testing is performed using different testing methodology at [facility] than at other [facility]. Direct result comparisons should only be made within the same method.      SARS-COV-2 PCR - Normal    Coronavirus 2019, PCR Not Detected      Narrative:     This assay has received FDA Emergency Use Authorization (EUA) and is only authorized for the duration of time that circumstances exist to justify the authorization of  the emergency use of in vitro diagnostic tests for the detection of SARS-CoV-2 virus and/or diagnosis of COVID-19 infection under section 564(b)(1) of the Act, 21 U.S.C. 360bbb-3(b)(1). This assay is an in vitro diagnostic nucleic acid amplification test for the qualitative detection of SARS-CoV-2 from nasopharyngeal specimens and has been validated for use at [facility]. Negative results do not preclude COVID-19 infections and should not be used as the sole basis for diagnosis, treatment, or other management decisions.     INFLUENZA A AND B PCR - Normal    Flu A Result Not Detected      Flu B Result Not Detected      Narrative:     This assay is an in vitro diagnostic multiplex nucleic acid amplification test for the detection and discrimination of Influenza A & B from nasopharyngeal specimens, and has been validated for use at [facility]. Negative results do not preclude Influenza A/B infections, and should not be used as the sole basis for diagnosis, treatment, or other management decisions. If Influenza A/B and RSV PCR results are negative, testing for Parainfluenza virus, Adenovirus and Metapneumovirus is routinely performed for [facility]ology and intensive care inpatients, and is available on other patients by placing an add-on request.   RSV PCR - Normal    RSV PCR Not Detected      Narrative:     This assay is an FDA-cleared, in vitro diagnostic nucleic acid amplification test for the detection of RSV from nasopharyngeal specimens, and has been validated for use at [facility]. Negative results do not preclude RSV infections, and should not be used as the sole basis for diagnosis, treatment, or other management decisions. If Influenza A/B and RSV PCR results are negative, testing for Parainfluenza virus, Adenovirus and Metapneumovirus is routinely performed for pediatric oncology and intensive care inpatients at [facility], and is  available on other patients by placing an add-on request.       CBC WITH AUTO DIFFERENTIAL    WBC 7.6      nRBC 0.0      RBC 5.38      Hemoglobin 15.7      Hematocrit 46.7      MCV 87      MCH 29.2      MCHC 33.6      RDW 12.1      Platelets 280      Neutrophils % 62.8      Immature Granulocytes %, Automated 0.1      Lymphocytes % 25.0      Monocytes % 9.3      Eosinophils % 2.1      Basophils % 0.7      Neutrophils Absolute 4.79      Immature Granulocytes Absolute, Automated 0.01      Lymphocytes Absolute 1.91      Monocytes Absolute 0.71      Eosinophils Absolute 0.16      Basophils Absolute 0.05     TROPONIN SERIES- (INITIAL, 1 HR)    Narrative:     The following orders were created for panel order Troponin I Series, High Sensitivity (0, 1 HR).  Procedure                               Abnormality         Status                     ---------                               -----------         ------                     Troponin I, High Sensiti...[[id number]]  Normal              Final result               Troponin, High Sensitivi...[[id number]]  Normal              Final result                 Please view results for these tests on the individual orders.        XR chest 1 view   Final Result   No acute cardiopulmonary process.             MACRO:   None.        Signed by: Brandan Kenyon 12/15/2024 9:21 PM   Dictation workstation:   [id number]           Differential:  As I have deemed necessary from their history, physical, and studies, I have considered the following diagnoses:     ACUTE MYOCARDIAL INFARCTION  PULMONARY EMBOLISM  AORTIC DISSECTION  PERICARDITIS  PNEUMOTHORAX  PNEUMONIA  COSTOCHONDRITIS OR MSK PAIN  PERFORATED PEPTIC ULCER  ESOPHAGEAL RUPTURE  GERD  CHOLECYSTITIS    Critical Care:      MDM Summary:  Unremarkable workup.  Patient safe for discharge.    We have discussed the diagnosis and risks, and we agree with discharging home to follow-up with appropriate physician as directed. We also discussed  returning to the Emergency Department immediately if new or worsening symptoms occur. We have discussed the symptoms which are most concerning that necessitate immediate return. Pt symptoms have been well controlled here and the patient is safe for discharge with appropriate outpatient follow up. The patient has verbalized understanding to return to ER without delay for new or worsening pains or for any other symptoms or concerns. I utilized the discharge clinical management tool provided [employer] to help estimate risk of negative outcome for this patient.      Disposition:  ED Prescriptions    None           Procedure  Procedures     Efren Hoang PA-C  12/16/24 1111

## 2025-03-30 ENCOUNTER — APPOINTMENT (OUTPATIENT)
Dept: RADIOLOGY | Facility: HOSPITAL | Age: 30
End: 2025-03-30

## 2025-03-30 ENCOUNTER — HOSPITAL ENCOUNTER (EMERGENCY)
Facility: HOSPITAL | Age: 30
Discharge: HOME | End: 2025-03-30
Attending: STUDENT IN AN ORGANIZED HEALTH CARE EDUCATION/TRAINING PROGRAM

## 2025-03-30 VITALS
BODY MASS INDEX: 39.17 KG/M2 | SYSTOLIC BLOOD PRESSURE: 112 MMHG | OXYGEN SATURATION: 96 % | HEART RATE: 105 BPM | WEIGHT: 315 LBS | DIASTOLIC BLOOD PRESSURE: 76 MMHG | HEIGHT: 75 IN | RESPIRATION RATE: 16 BRPM | TEMPERATURE: 99.1 F

## 2025-03-30 DIAGNOSIS — R00.0 TACHYCARDIA: ICD-10-CM

## 2025-03-30 DIAGNOSIS — R10.9 ACUTE ABDOMINAL PAIN: Primary | ICD-10-CM

## 2025-03-30 DIAGNOSIS — Z87.19 HISTORY OF ULCERATIVE COLITIS: ICD-10-CM

## 2025-03-30 DIAGNOSIS — R11.2 ACUTE NAUSEA WITH NONBILIOUS VOMITING: ICD-10-CM

## 2025-03-30 DIAGNOSIS — R19.7 ACUTE DIARRHEA: ICD-10-CM

## 2025-03-30 LAB
ALBUMIN SERPL BCP-MCNC: 4.5 G/DL (ref 3.4–5)
ALP SERPL-CCNC: 69 U/L (ref 33–120)
ALT SERPL W P-5'-P-CCNC: 33 U/L (ref 10–52)
ANION GAP SERPL CALCULATED.3IONS-SCNC: 13 MMOL/L (ref 10–20)
AST SERPL W P-5'-P-CCNC: 19 U/L (ref 9–39)
BASOPHILS # BLD AUTO: 0.04 X10*3/UL (ref 0–0.1)
BASOPHILS NFR BLD AUTO: 0.4 %
BILIRUB SERPL-MCNC: 0.7 MG/DL (ref 0–1.2)
BUN SERPL-MCNC: 14 MG/DL (ref 6–23)
CALCIUM SERPL-MCNC: 9.3 MG/DL (ref 8.6–10.3)
CHLORIDE SERPL-SCNC: 103 MMOL/L (ref 98–107)
CO2 SERPL-SCNC: 22 MMOL/L (ref 21–32)
CREAT SERPL-MCNC: 0.97 MG/DL (ref 0.5–1.3)
CRP SERPL-MCNC: 1.45 MG/DL
EGFRCR SERPLBLD CKD-EPI 2021: >90 ML/MIN/1.73M*2
EOSINOPHIL # BLD AUTO: 0.08 X10*3/UL (ref 0–0.7)
EOSINOPHIL NFR BLD AUTO: 0.7 %
ERYTHROCYTE [DISTWIDTH] IN BLOOD BY AUTOMATED COUNT: 12.8 % (ref 11.5–14.5)
ERYTHROCYTE [SEDIMENTATION RATE] IN BLOOD BY WESTERGREN METHOD: 23 MM/H (ref 0–15)
FLUAV RNA RESP QL NAA+PROBE: NOT DETECTED
FLUBV RNA RESP QL NAA+PROBE: NOT DETECTED
GLUCOSE SERPL-MCNC: 115 MG/DL (ref 74–99)
HCT VFR BLD AUTO: 46.7 % (ref 41–52)
HGB BLD-MCNC: 16.4 G/DL (ref 13.5–17.5)
IMM GRANULOCYTES # BLD AUTO: 0.06 X10*3/UL (ref 0–0.7)
IMM GRANULOCYTES NFR BLD AUTO: 0.5 % (ref 0–0.9)
LACTATE SERPL-SCNC: 1.1 MMOL/L (ref 0.4–2)
LIPASE SERPL-CCNC: 17 U/L (ref 9–82)
LYMPHOCYTES # BLD AUTO: 0.55 X10*3/UL (ref 1.2–4.8)
LYMPHOCYTES NFR BLD AUTO: 4.9 %
MAGNESIUM SERPL-MCNC: 1.85 MG/DL (ref 1.6–2.4)
MCH RBC QN AUTO: 30.7 PG (ref 26–34)
MCHC RBC AUTO-ENTMCNC: 35.1 G/DL (ref 32–36)
MCV RBC AUTO: 87 FL (ref 80–100)
MONOCYTES # BLD AUTO: 0.42 X10*3/UL (ref 0.1–1)
MONOCYTES NFR BLD AUTO: 3.7 %
NEUTROPHILS # BLD AUTO: 10.07 X10*3/UL (ref 1.2–7.7)
NEUTROPHILS NFR BLD AUTO: 89.8 %
NRBC BLD-RTO: 0 /100 WBCS (ref 0–0)
PLATELET # BLD AUTO: 315 X10*3/UL (ref 150–450)
POTASSIUM SERPL-SCNC: 4 MMOL/L (ref 3.5–5.3)
PROT SERPL-MCNC: 8.4 G/DL (ref 6.4–8.2)
RBC # BLD AUTO: 5.35 X10*6/UL (ref 4.5–5.9)
SARS-COV-2 RNA RESP QL NAA+PROBE: NOT DETECTED
SODIUM SERPL-SCNC: 134 MMOL/L (ref 136–145)
WBC # BLD AUTO: 11.2 X10*3/UL (ref 4.4–11.3)

## 2025-03-30 PROCEDURE — 74177 CT ABD & PELVIS W/CONTRAST: CPT | Performed by: RADIOLOGY

## 2025-03-30 PROCEDURE — 2500000001 HC RX 250 WO HCPCS SELF ADMINISTERED DRUGS (ALT 637 FOR MEDICARE OP): Performed by: STUDENT IN AN ORGANIZED HEALTH CARE EDUCATION/TRAINING PROGRAM

## 2025-03-30 PROCEDURE — 83735 ASSAY OF MAGNESIUM: CPT | Performed by: STUDENT IN AN ORGANIZED HEALTH CARE EDUCATION/TRAINING PROGRAM

## 2025-03-30 PROCEDURE — 86140 C-REACTIVE PROTEIN: CPT | Performed by: STUDENT IN AN ORGANIZED HEALTH CARE EDUCATION/TRAINING PROGRAM

## 2025-03-30 PROCEDURE — 80053 COMPREHEN METABOLIC PANEL: CPT | Performed by: STUDENT IN AN ORGANIZED HEALTH CARE EDUCATION/TRAINING PROGRAM

## 2025-03-30 PROCEDURE — 2500000004 HC RX 250 GENERAL PHARMACY W/ HCPCS (ALT 636 FOR OP/ED): Performed by: STUDENT IN AN ORGANIZED HEALTH CARE EDUCATION/TRAINING PROGRAM

## 2025-03-30 PROCEDURE — 96374 THER/PROPH/DIAG INJ IV PUSH: CPT | Mod: 59

## 2025-03-30 PROCEDURE — 85652 RBC SED RATE AUTOMATED: CPT | Performed by: STUDENT IN AN ORGANIZED HEALTH CARE EDUCATION/TRAINING PROGRAM

## 2025-03-30 PROCEDURE — 36415 COLL VENOUS BLD VENIPUNCTURE: CPT | Performed by: STUDENT IN AN ORGANIZED HEALTH CARE EDUCATION/TRAINING PROGRAM

## 2025-03-30 PROCEDURE — 83690 ASSAY OF LIPASE: CPT | Performed by: STUDENT IN AN ORGANIZED HEALTH CARE EDUCATION/TRAINING PROGRAM

## 2025-03-30 PROCEDURE — 87636 SARSCOV2 & INF A&B AMP PRB: CPT | Performed by: STUDENT IN AN ORGANIZED HEALTH CARE EDUCATION/TRAINING PROGRAM

## 2025-03-30 PROCEDURE — 96375 TX/PRO/DX INJ NEW DRUG ADDON: CPT

## 2025-03-30 PROCEDURE — 2500000005 HC RX 250 GENERAL PHARMACY W/O HCPCS: Performed by: STUDENT IN AN ORGANIZED HEALTH CARE EDUCATION/TRAINING PROGRAM

## 2025-03-30 PROCEDURE — 99285 EMERGENCY DEPT VISIT HI MDM: CPT | Mod: 25 | Performed by: STUDENT IN AN ORGANIZED HEALTH CARE EDUCATION/TRAINING PROGRAM

## 2025-03-30 PROCEDURE — 85025 COMPLETE CBC W/AUTO DIFF WBC: CPT | Performed by: STUDENT IN AN ORGANIZED HEALTH CARE EDUCATION/TRAINING PROGRAM

## 2025-03-30 PROCEDURE — 74177 CT ABD & PELVIS W/CONTRAST: CPT

## 2025-03-30 PROCEDURE — 83605 ASSAY OF LACTIC ACID: CPT | Performed by: STUDENT IN AN ORGANIZED HEALTH CARE EDUCATION/TRAINING PROGRAM

## 2025-03-30 PROCEDURE — 2550000001 HC RX 255 CONTRASTS: Performed by: STUDENT IN AN ORGANIZED HEALTH CARE EDUCATION/TRAINING PROGRAM

## 2025-03-30 RX ORDER — PANTOPRAZOLE SODIUM 40 MG/10ML
40 INJECTION, POWDER, LYOPHILIZED, FOR SOLUTION INTRAVENOUS DAILY
Status: DISCONTINUED | OUTPATIENT
Start: 2025-03-30 | End: 2025-03-30 | Stop reason: HOSPADM

## 2025-03-30 RX ORDER — METOCLOPRAMIDE HYDROCHLORIDE 5 MG/ML
10 INJECTION INTRAMUSCULAR; INTRAVENOUS ONCE
Status: COMPLETED | OUTPATIENT
Start: 2025-03-30 | End: 2025-03-30

## 2025-03-30 RX ORDER — HYDROMORPHONE HYDROCHLORIDE 1 MG/ML
1 INJECTION, SOLUTION INTRAMUSCULAR; INTRAVENOUS; SUBCUTANEOUS ONCE
Status: DISCONTINUED | OUTPATIENT
Start: 2025-03-30 | End: 2025-03-30

## 2025-03-30 RX ORDER — HYDROMORPHONE HYDROCHLORIDE 1 MG/ML
1 INJECTION, SOLUTION INTRAMUSCULAR; INTRAVENOUS; SUBCUTANEOUS ONCE
Status: COMPLETED | OUTPATIENT
Start: 2025-03-30 | End: 2025-03-30

## 2025-03-30 RX ORDER — ALUMINUM HYDROXIDE, MAGNESIUM HYDROXIDE, AND SIMETHICONE 1200; 120; 1200 MG/30ML; MG/30ML; MG/30ML
30 SUSPENSION ORAL ONCE
Status: COMPLETED | OUTPATIENT
Start: 2025-03-30 | End: 2025-03-30

## 2025-03-30 RX ORDER — MORPHINE SULFATE 4 MG/ML
4 INJECTION, SOLUTION INTRAMUSCULAR; INTRAVENOUS ONCE
Status: COMPLETED | OUTPATIENT
Start: 2025-03-30 | End: 2025-03-30

## 2025-03-30 RX ORDER — ONDANSETRON HYDROCHLORIDE 2 MG/ML
4 INJECTION, SOLUTION INTRAVENOUS ONCE
Status: DISCONTINUED | OUTPATIENT
Start: 2025-03-30 | End: 2025-03-30

## 2025-03-30 RX ORDER — LIDOCAINE HYDROCHLORIDE 20 MG/ML
15 SOLUTION OROPHARYNGEAL ONCE
Status: COMPLETED | OUTPATIENT
Start: 2025-03-30 | End: 2025-03-30

## 2025-03-30 RX ORDER — ONDANSETRON HYDROCHLORIDE 2 MG/ML
4 INJECTION, SOLUTION INTRAVENOUS ONCE
Status: COMPLETED | OUTPATIENT
Start: 2025-03-30 | End: 2025-03-30

## 2025-03-30 RX ADMIN — ONDANSETRON 4 MG: 2 INJECTION, SOLUTION INTRAMUSCULAR; INTRAVENOUS at 03:31

## 2025-03-30 RX ADMIN — IOHEXOL 75 ML: 350 INJECTION, SOLUTION INTRAVENOUS at 03:48

## 2025-03-30 RX ADMIN — SODIUM CHLORIDE 1000 ML: 9 INJECTION, SOLUTION INTRAVENOUS at 03:31

## 2025-03-30 RX ADMIN — HYDROMORPHONE HYDROCHLORIDE 1 MG: 1 INJECTION, SOLUTION INTRAMUSCULAR; INTRAVENOUS; SUBCUTANEOUS at 04:41

## 2025-03-30 RX ADMIN — LIDOCAINE HYDROCHLORIDE 15 ML: 20 SOLUTION ORAL at 05:09

## 2025-03-30 RX ADMIN — ALUMINUM HYDROXIDE, MAGNESIUM HYDROXIDE, AND SIMETHICONE 30 ML: 200; 200; 20 SUSPENSION ORAL at 05:09

## 2025-03-30 RX ADMIN — MORPHINE SULFATE 4 MG: 4 INJECTION, SOLUTION INTRAMUSCULAR; INTRAVENOUS at 03:30

## 2025-03-30 RX ADMIN — METOCLOPRAMIDE 10 MG: 5 INJECTION, SOLUTION INTRAMUSCULAR; INTRAVENOUS at 04:04

## 2025-03-30 ASSESSMENT — LIFESTYLE VARIABLES
HAVE YOU EVER FELT YOU SHOULD CUT DOWN ON YOUR DRINKING: NO
EVER FELT BAD OR GUILTY ABOUT YOUR DRINKING: NO
HAVE PEOPLE ANNOYED YOU BY CRITICIZING YOUR DRINKING: NO
EVER HAD A DRINK FIRST THING IN THE MORNING TO STEADY YOUR NERVES TO GET RID OF A HANGOVER: NO
TOTAL SCORE: 0

## 2025-03-30 ASSESSMENT — PAIN DESCRIPTION - LOCATION: LOCATION: ABDOMEN

## 2025-03-30 ASSESSMENT — PAIN SCALES - GENERAL
PAINLEVEL_OUTOF10: 7
PAINLEVEL_OUTOF10: 6
PAINLEVEL_OUTOF10: 4
PAINLEVEL_OUTOF10: 4

## 2025-03-30 ASSESSMENT — COLUMBIA-SUICIDE SEVERITY RATING SCALE - C-SSRS
6. HAVE YOU EVER DONE ANYTHING, STARTED TO DO ANYTHING, OR PREPARED TO DO ANYTHING TO END YOUR LIFE?: NO
1. IN THE PAST MONTH, HAVE YOU WISHED YOU WERE DEAD OR WISHED YOU COULD GO TO SLEEP AND NOT WAKE UP?: NO
2. HAVE YOU ACTUALLY HAD ANY THOUGHTS OF KILLING YOURSELF?: NO

## 2025-03-30 ASSESSMENT — PAIN - FUNCTIONAL ASSESSMENT: PAIN_FUNCTIONAL_ASSESSMENT: 0-10

## 2025-03-30 NOTE — DISCHARGE INSTRUCTIONS
Thank you for allowing myself and the team to take care of you during your emergency situation and addressing your health concerns.    You were evaluated for abdominal pain.    Your likely condition/diagnosis: Viral gastroenteritis versus early mild ulcerative colitis flare    During your visit in the emergency department you were evaluated for your presenting symptoms.  Based on the extensive workup you received, all efforts were made to identify the source of your symptoms and identify any life-threatening conditions.  These life-threatening conditions that were attempted to be identified and medically managed/treated include but not limited to bleeding/non-bleeding ulcers/wounds of your stomach or intestines, hole in your stomach or intestines, infection/inflammation of your liver, infection/inflammation of your gallbladder and associated biliary (bile) system, appendicitis (infection/inflammation of your appendix), infection/inflammation of your pancreas, bowel/intestine obstruction, small or large intestine inflammation/infection, infection/abscess inside your abdomen, hematoma/bleeding (hemorrhage) inside your abdomen, pregnancy or ectopic pregnancy, ovarian torsion, infection/inflammation of your uterus/fallopian tubes.  Additionally, you may be experiencing symptoms that are non-life-threatening but are uncomfortable and disruptive to your everyday life.  All efforts were made to manage your symptoms while in the emergency department along with appropriate at home therapy for symptomatic management during your recovery. Please be aware that not all of the conditions explained/discussed in these discharge instructions are applicable to your condition but encompass many of the conditions that were evaluated for during your ED encounter.      During your evaluation and assessment, all measures were taken to evaluate you and address your health concerns to identify dangerous and life threatening health  conditions. It is not possible to identify all health conditions or pathologies and eliminate any chance of unfavorable outcomes while in the Emergency Department. My team encourages you to be vigilant with your health and follow-up with the appropriate providers outlined in your discharge paperwork. Please return to the Emergency Department if you feel that your health has not improved or experiencing worsening symptoms.    Special instructions please make follow-up appoint with your primary care physician to further manage symptoms and address your health concerns.  Please continue to use medication as prescribed for your UC flares.    Incidental findings:  None.

## 2025-03-30 NOTE — ED PROVIDER NOTES
HPI   Chief Complaint   Patient presents with    Abdominal Pain     Pt arrives to the ED r/t lower quadrant abd pain that started around 2000. Pt states he's also having n/v/d. Denies any hematemesis or hematochezia. Hx of UC. Denies any CP/SOB. Hx heart cath without stent placement. Denies any urinary symptoms.        Patient presents ED for acute onset of diffuse lower abdominal pain.  Notes it is cramping and intermittently sharp/stabbing, constant, moderate to severe at maximal intensity, nonradiating, no alleviating/aggravating factors.  Notes no appreciable fever/chills.  Notes history of UC and has intermittent hematochezia but no significant analgesia today.  Notes new onset of nonbloody/nonbilious emesis.  Notes no abdominal surgical history and does not appreciate abdominal distention.  Does not appreciate any  symptoms.  States he has had numerous bouts of diarrhea and intermittent contact with sick individuals that have had norovirus.  He present denies any URI symptoms.  Notes has been managing his UC primarily with diet but does take his medications with flares that have not helped today.  Denies any other significant systemic symptoms or complaints.              Patient History   Past Medical History:   Diagnosis Date    Anxiety 10/30/2023    Arthritis     Chronic GERD 10/06/2023    Elevated blood pressure reading without diagnosis of hypertension 09/27/2015    Lymphedema 10/30/2023    Mixed hyperlipidemia 09/27/2015    Vitamin D deficiency 03/15/2015     No past surgical history on file.  Family History   Problem Relation Name Age of Onset    Arthritis Mother      Allergies Mother      Thrombocytopenia Mother      No Known Problems Father       Social History     Tobacco Use    Smoking status: Former     Current packs/day: 0.50     Types: Cigarettes    Smokeless tobacco: Never   Vaping Use    Vaping status: Never Used   Substance Use Topics    Alcohol use: Not Currently     Alcohol/week: 1.0  "standard drink of alcohol     Types: 1 Cans of beer per week    Drug use: Yes     Types: Other       Physical Exam   /76 (BP Location: Left arm, Patient Position: Sitting)   Pulse (!) 105   Temp 37.3 °C (99.1 °F) (Temporal)   Resp 16   Ht 1.905 m (6' 3\")   Wt 145 kg (320 lb)   SpO2 96%   BMI 40.00 kg/m²       Physical Exam  Vitals and nursing note reviewed.   Constitutional:       General: He is not in acute distress.     Appearance: Normal appearance. He is not ill-appearing.   HENT:      Mouth/Throat:      Mouth: Mucous membranes are moist.      Pharynx: Oropharynx is clear.   Eyes:      General: No scleral icterus.     Conjunctiva/sclera: Conjunctivae normal.   Cardiovascular:      Rate and Rhythm: Normal rate.   Pulmonary:      Effort: Pulmonary effort is normal.   Abdominal:      General: Abdomen is protuberant. Bowel sounds are increased. There is no distension.      Palpations: Abdomen is soft. There is no mass.      Tenderness: There is abdominal tenderness in the right lower quadrant, suprapubic area and left lower quadrant. There is no right CVA tenderness, left CVA tenderness, guarding or rebound. Negative signs include McBurney's sign.      Comments: Diffuse lower abdominal tenderness no appreciable focality, no appreciable McBurney's point tenderness, no abdominal wall rigidity or voluntary guarding and not endorsing associated rebound tenderness, no appreciable abdominal distention/increased tympany, no appreciable pain out of portion to/with exam   Skin:     General: Skin is warm and dry.      Coloration: Skin is not jaundiced or pale.   Neurological:      General: No focal deficit present.      Mental Status: He is alert and oriented to person, place, and time.           ED Course & MDM   ED Course as of 03/30/25 2105   Sun Mar 30, 2025   0330 VS notable for tachycardia on presentation in setting of abdominal pain, remaining VSS [BC]   0336 Unremarkable and noncontributory to Patient " condition/symptoms [BC]   0400 Comprehensive metabolic panel(!)  Unremarkable and noncontributory to Patient condition/symptoms [BC]   0400 Lactate  WNL, no concern for acute mesenteric ischemia or significant inflammation [BC]   0400 Magnesium  WNL [BC]   0400 Lipase  WNL, no concern for acute pancreatitis [BC]   0405 CT abdomen pelvis w IV contrast  IMPRESSION:  Fluid contents in the colon with minimal distal wall thickening which  may be infectious or inflammatory. Correlation for colitis suggested.  The appendix is unremarkable.    I have personally reviewed and interpreted the images, no evidence of appendicitis, minimal inflammation noted in the large bowels, no intra-abdominal abscess, agree with radiology final read [BC]   0434 C-reactive protein(!)  Mildly elevated in the setting of GI symptoms, low suspicion for UC flare [BC]   0434 Sedimentation rate, automated(!)  Mildly elevated in the setting of GI symptoms, low suspicion for UC flare [BC]   0501 Sars-CoV-2 and Influenza A/B PCR  Unresectable in setting of GI symptoms [BC]   0520 On reassessment patient endorses moderate proven symptoms post ED interventions, not Dors any new or actively worsening symptoms.  Tolerating p.o. without symptomatic episodes of nausea or emesis, moderate treatment and VS. [BC]      ED Course User Index  [BC] Phill Kaiser MD         Diagnoses as of 03/30/25 2105   Acute abdominal pain   Acute nausea with nonbilious vomiting   Acute diarrhea   History of ulcerative colitis   Tachycardia                 No data recorded     Sabino Coma Scale Score: 15 (03/30/25 0319 : Naveed Mclean, JOHNNY)                           Medical Decision Making  Patient presented to the ED for evaluation of acute onset lower abdominal pain with associated nonbloody/nonbilious emesis and nonbloody diarrhea with concerning PMHx of UC, obesity, HEMANT, HLD, GERD.  I personally reviewed and interpreted VS, labs, and images which are as stated  above in the ED course.    Assessment/evaluation consistent with likely gastroenteritis and consistent with likely viral in origin of unidentified pathogen.  No concerning history, clinical evidence/work-up, or exam findings for the considered differentials of significant lecture light/metabolic derangement, COVID/influenza viral syndrome, appendicitis, significant UC flare.  These conditions have been thoroughly evaluated and determined to be sufficiently unlikely to be the etiology of patient's presenting symptoms.    Prescribed none, room and taking his home UC flare regiment.  After receiving an appropriate exam, clinical work-up, and necessary interventions/treatment, Patient is appropriate for discharge at this time due to no concerning symptoms or findings requiring hospitalization for stabilization or further interrogation/management and is appropriate for management of symptoms at home with recommended appropriate outpatient follow-up.  Patient was encouraged to ask any questions or for clarification of today's ED encounter.  Patient is agreeable to plan of care. Discussed with Patient today's results/findings and likely diagnosis, provided appropriate RTED precautions along with recommended follow-up with PCP.      Per Chart Review: Nothing pertinent to this ED encounter.      Parts of this chart have been completed using voice-to-text recognition software. Please excuse any errors of transcription that were missed for editing/correcting.      Amount and/or Complexity of Data Reviewed  Labs: ordered. Decision-making details documented in ED Course.  Radiology: ordered and independent interpretation performed. Decision-making details documented in ED Course.      Procedure  Procedures     Phill Kaiser MD  03/30/25 9733

## 2025-05-02 NOTE — PROGRESS NOTES
Per chart review, appears chronic  No evidence of active bleeding  Holding DVT prophylaxis  Daily CBC   Physical Therapy    Physical Therapy Evaluation    Patient Name: Yvon Parks  MRN: [id number]  Today's Date: 12/28/2023   Time Calculation  Start Time: 1322  Stop Time: 1340  Time Calculation (min): 18 min    Assessment/Plan   PT Assessment  Rehab Prognosis: Good  Evaluation/Treatment Tolerance: Patient tolerated treatment well  Medical Staff Made Aware: Yes  Strengths: Ability to acquire knowledge, Attitude of self, Premorbid level of function  End of Session Communication: Bedside nurse  Assessment Comment: pt is functioning independently and is able to recognize when L LE is becoming problematic. pt will benefit from outpatient physical therapy services to improve back pain and decrease left LE dysfunction.  End of Session Patient Position: Bed, 2 rail up  IP OR SWING BED PT PLAN  Inpatient or Swing Bed: Inpatient  PT Plan  PT Plan: PT Eval only  PT Eval Only Reason: At baseline function  PT Frequency: PT eval only  PT Discharge Recommendations: Low intensity level of continued care, Other (comment) (outpatient physical therapy)  PT Recommended Transfer Status: Independent  PT - OK to Discharge: Yes    Subjective   General Visit Information:  General  Reason for Referral: Impaired Mobility  Referred By: Guille Shi MD  Past Medical History Relevant to Rehab: Anxiety, GERD, lymphedema, HLD,Cervical radiculopathy, IBS  Missed Visit: No  Missed Visit Reason: Other (Comment)  Family/Caregiver Present: No  Co-Treatment: OT  Co-Treatment Reason: patient and caregiver safety due to history of falls  Prior to Session Communication: Bedside nurse  Patient Position Received: Bed, 2 rail up, Alarm off, not on at start of session  Preferred Learning Style: verbal  General Comment: 27 y.o. male admitted with c/o left hip pain, LLE numbness, and low back pain. Pt reports multiple recent falls. MRI LSPINE:small central disc herniation and minute tear of the annulus fibrosis  posteriorly involving the L4-L5 disc  Home  Living:  Home Living  Type of Home: House  Lives With: Parent(s)  Home Adaptive Equipment: None  Home Layout: Two level, Laundry in basement, Full bath main level, Able to live on main level with bedroom/bathroom, Stairs to alternate level with rails  Alternate Level Stairs-Rails:  (unilateral)  Alternate Level Stairs-Number of Steps: 15 (basement)  Home Access: Stairs to enter with rails  Entrance Stairs-Rails: Both  Entrance Stairs-Number of Steps: 3  Bathroom Shower/Tub: Tub/shower unit  Prior Level of Function:  Prior Function Per Pt/Caregiver Report  Level of [place]: Independent with ADLs and functional transfers, Independent with homemaking with ambulation  Receives Help From: Family  ADL Assistance: Independent  Homemaking Assistance: Independent  Driving/Transportation: Independent  Ambulatory Assistance: Independent  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)  Hand Dominance: Right  Precautions:  Precautions  Hearing/Visual Limitations: none  Medical Precautions: Fall precautions  Vital Signs:  Vital Signs  Heart Rate: (!) 116  Heart Rate Source: Monitor  Patient Position: Lying    Objective   Pain:  Pain Assessment  Pain Assessment: 0-10  Pain Score: 8  Pain Type: Acute pain  Pain Location: Back  Pain Orientation: Lower  Pain Radiating Towards: non-radiating  Pain Frequency: Constant/continuous  Patient's Stated Pain Goal: No pain  Pain Interventions:  (RN is waiting for order for medication for pain)  Cognition:  Cognition  Overall Cognitive Status: Within Functional Limits    General Assessments:  General Observation  General Observation: pt very pleasant and cooperative. no issues with skin integrity.    Activity Tolerance  Endurance: Decreased tolerance for upright activites  Activity Tolerance Comments: good    Sensation  Sensation Comment: numbness left lateral thigh    Strength  Strength Comments: JOAQUIM Stonength appears normal, subjective complaints of wekaness left  LE  Coordination  Coordination Comment: guarded, slow mobility, cautious    Postural Control  Posture Comment: WFL    Static Sitting Balance  Static Sitting-Level of Assistance: Independent    Static Standing Balance  Static Standing-Level of Assistance: Independent  Functional Assessments:  ADL  ADL's Addressed: No    Bed Mobility  Bed Mobility:  (independent supine<->sit, HOB slightly elevated)    Transfers  Transfer:  (independent sit<->stand)    Ambulation/Gait Training  Ambulation/Gait Training Performed:  (Amb few steps at the bedside without an assistive device. no LOB however reporting tremulousness and fasiculations in left thigh, pt returned to bed.)    Stairs  Stairs: No  Extremity/Trunk Assessments:  RLE   RLE : Within Functional Limits  LLE   LLE : Within Functional Limits  Outcome Measures:  [facility] Basic Mobility  Turning from your back to your side while in a flat bed without using bedrails: None  Moving from lying on your back to sitting on the side of a flat bed without using bedrails: None  Moving to and from bed to chair (including a wheelchair): None  Standing up from a chair using your arms (e.g. wheelchair or bedside chair): None  To walk in hospital room: None  Climbing 3-5 steps with railing: None  Basic Mobility - Total Score: 24    Education Documentation  Body Mechanics, taught by Cynthia Lerner, PT at 12/28/2023  2:59 PM.  Learner: Patient  Readiness: Acceptance  Method: Explanation  Response: Verbalizes Understanding    Education Comments  No comments found.